# Patient Record
Sex: FEMALE | Race: WHITE | NOT HISPANIC OR LATINO | URBAN - NONMETROPOLITAN AREA
[De-identification: names, ages, dates, MRNs, and addresses within clinical notes are randomized per-mention and may not be internally consistent; named-entity substitution may affect disease eponyms.]

---

## 2023-03-24 DIAGNOSIS — E11.9 TYPE 2 DIABETES MELLITUS WITHOUT COMPLICATION, WITHOUT LONG-TERM CURRENT USE OF INSULIN (MULTI): Primary | ICD-10-CM

## 2023-03-24 RX ORDER — METFORMIN HYDROCHLORIDE 750 MG/1
1500 TABLET, EXTENDED RELEASE ORAL
COMMUNITY
End: 2023-03-24 | Stop reason: SDUPTHER

## 2023-04-04 RX ORDER — METFORMIN HYDROCHLORIDE 750 MG/1
1500 TABLET, EXTENDED RELEASE ORAL
Qty: 180 TABLET | Refills: 0 | Status: SHIPPED | OUTPATIENT
Start: 2023-04-04 | End: 2023-07-12 | Stop reason: WASHOUT

## 2023-06-28 DIAGNOSIS — E78.5 DYSLIPIDEMIA: ICD-10-CM

## 2023-06-28 RX ORDER — SIMVASTATIN 20 MG/1
20 TABLET, FILM COATED ORAL NIGHTLY
Qty: 90 TABLET | Refills: 0 | Status: SHIPPED | OUTPATIENT
Start: 2023-06-28 | End: 2023-07-17 | Stop reason: SDUPTHER

## 2023-06-28 RX ORDER — SIMVASTATIN 20 MG/1
20 TABLET, FILM COATED ORAL NIGHTLY
COMMUNITY
End: 2023-06-28 | Stop reason: SDUPTHER

## 2023-07-11 PROBLEM — E11.9 DM2 (DIABETES MELLITUS, TYPE 2) (MULTI): Status: ACTIVE | Noted: 2023-07-11

## 2023-07-11 PROBLEM — M75.101 NONTRAUMATIC TEAR OF RIGHT ROTATOR CUFF: Status: RESOLVED | Noted: 2023-07-11 | Resolved: 2023-07-11

## 2023-07-11 PROBLEM — E55.9 VITAMIN D DEFICIENCY: Status: ACTIVE | Noted: 2023-07-11

## 2023-07-11 PROBLEM — H81.10 BPPV (BENIGN PAROXYSMAL POSITIONAL VERTIGO): Status: ACTIVE | Noted: 2023-07-11

## 2023-07-11 PROBLEM — E78.5 DYSLIPIDEMIA: Status: ACTIVE | Noted: 2023-07-11

## 2023-07-11 PROBLEM — Z90.89 HISTORY OF TONSILLECTOMY: Status: RESOLVED | Noted: 2023-07-11 | Resolved: 2023-07-11

## 2023-07-11 PROBLEM — F41.9 ANXIETY: Status: ACTIVE | Noted: 2023-07-11

## 2023-07-11 PROBLEM — I10 HTN (HYPERTENSION): Status: ACTIVE | Noted: 2023-07-11

## 2023-07-11 PROBLEM — M54.12 CERVICAL RADICULOPATHY, ACUTE: Status: RESOLVED | Noted: 2023-07-11 | Resolved: 2023-07-11

## 2023-07-11 PROBLEM — K44.9 HIATAL HERNIA: Status: ACTIVE | Noted: 2023-07-11

## 2023-07-11 PROBLEM — F17.210 CIGARETTE NICOTINE DEPENDENCE WITHOUT COMPLICATION: Status: ACTIVE | Noted: 2023-07-11

## 2023-07-11 PROBLEM — M75.100 ROTATOR CUFF TEAR: Status: RESOLVED | Noted: 2023-07-11 | Resolved: 2023-07-11

## 2023-07-11 PROBLEM — J30.9 ALLERGIC RHINITIS: Status: ACTIVE | Noted: 2023-07-11

## 2023-07-11 PROBLEM — M50.20 HERNIATED CERVICAL DISC: Status: ACTIVE | Noted: 2023-07-11

## 2023-07-11 PROBLEM — N18.1 STAGE 1 CHRONIC KIDNEY DISEASE: Status: ACTIVE | Noted: 2023-07-11

## 2023-07-11 PROBLEM — R53.82 CHRONIC FATIGUE: Status: ACTIVE | Noted: 2023-07-11

## 2023-07-11 PROBLEM — K21.9 GERD (GASTROESOPHAGEAL REFLUX DISEASE): Status: ACTIVE | Noted: 2023-07-11

## 2023-07-11 RX ORDER — OMEPRAZOLE 40 MG/1
1 CAPSULE, DELAYED RELEASE ORAL
COMMUNITY
Start: 2023-02-07 | End: 2023-07-12 | Stop reason: WASHOUT

## 2023-07-11 RX ORDER — SUCRALFATE 1 G/1
1 TABLET ORAL
COMMUNITY
Start: 2023-02-07 | End: 2024-02-21 | Stop reason: WASHOUT

## 2023-07-11 RX ORDER — ESCITALOPRAM OXALATE 20 MG/1
20 TABLET ORAL DAILY
COMMUNITY
End: 2023-07-12 | Stop reason: WASHOUT

## 2023-07-11 RX ORDER — BACLOFEN 10 MG/1
10 TABLET ORAL 3 TIMES DAILY
COMMUNITY
Start: 2022-11-16 | End: 2023-07-12 | Stop reason: WASHOUT

## 2023-07-11 RX ORDER — BLOOD-GLUCOSE METER
EACH MISCELLANEOUS
COMMUNITY

## 2023-07-11 RX ORDER — VIT C/E/ZN/COPPR/LUTEIN/ZEAXAN 250MG-90MG
25 CAPSULE ORAL DAILY
COMMUNITY
Start: 2021-04-15

## 2023-07-11 RX ORDER — MELOXICAM 15 MG/1
1 TABLET ORAL DAILY PRN
COMMUNITY
Start: 2020-06-30 | End: 2024-05-24 | Stop reason: SDUPTHER

## 2023-07-11 RX ORDER — LISINOPRIL 5 MG/1
5 TABLET ORAL DAILY
COMMUNITY
End: 2023-07-17 | Stop reason: SDUPTHER

## 2023-07-11 RX ORDER — GLIMEPIRIDE 1 MG/1
1 TABLET ORAL
COMMUNITY
End: 2023-07-12 | Stop reason: WASHOUT

## 2023-07-11 RX ORDER — FAMOTIDINE 20 MG/1
1 TABLET, FILM COATED ORAL NIGHTLY PRN
COMMUNITY
Start: 2023-02-07 | End: 2024-02-21

## 2023-07-11 RX ORDER — ACETAMINOPHEN, DEXTROMETHORPHAN HBR, DOXYLAMINE SUCCINATE, PHENYLEPHRINE HCL 650; 20; 12.5; 1 MG/30ML; MG/30ML; MG/30ML; MG/30ML
1 SOLUTION ORAL DAILY
COMMUNITY
Start: 2021-04-15

## 2023-07-11 RX ORDER — LANCETS 30 GAUGE
EACH MISCELLANEOUS
COMMUNITY
Start: 2023-06-01

## 2023-07-11 RX ORDER — MECLIZINE HCL 12.5 MG 12.5 MG/1
1 TABLET ORAL 3 TIMES DAILY PRN
COMMUNITY
Start: 2023-02-07 | End: 2024-02-21 | Stop reason: WASHOUT

## 2023-07-12 ENCOUNTER — OFFICE VISIT (OUTPATIENT)
Dept: PRIMARY CARE | Facility: CLINIC | Age: 53
End: 2023-07-12
Payer: COMMERCIAL

## 2023-07-12 VITALS
TEMPERATURE: 98.6 F | SYSTOLIC BLOOD PRESSURE: 110 MMHG | DIASTOLIC BLOOD PRESSURE: 84 MMHG | WEIGHT: 161.6 LBS | HEIGHT: 63 IN | HEART RATE: 86 BPM | OXYGEN SATURATION: 96 % | BODY MASS INDEX: 28.63 KG/M2

## 2023-07-12 DIAGNOSIS — F34.1 PERSISTENT DEPRESSIVE DISORDER: Primary | ICD-10-CM

## 2023-07-12 DIAGNOSIS — E11.9 TYPE 2 DIABETES MELLITUS WITHOUT COMPLICATION, UNSPECIFIED WHETHER LONG TERM INSULIN USE (MULTI): ICD-10-CM

## 2023-07-12 DIAGNOSIS — Z12.31 ENCOUNTER FOR SCREENING MAMMOGRAM FOR BREAST CANCER: ICD-10-CM

## 2023-07-12 DIAGNOSIS — M79.10 MYALGIA: ICD-10-CM

## 2023-07-12 DIAGNOSIS — K21.9 GASTROESOPHAGEAL REFLUX DISEASE WITHOUT ESOPHAGITIS: ICD-10-CM

## 2023-07-12 PROBLEM — I10 HTN (HYPERTENSION): Status: RESOLVED | Noted: 2023-07-11 | Resolved: 2023-07-12

## 2023-07-12 PROCEDURE — 3066F NEPHROPATHY DOC TX: CPT | Performed by: PHYSICIAN ASSISTANT

## 2023-07-12 PROCEDURE — 3074F SYST BP LT 130 MM HG: CPT | Performed by: PHYSICIAN ASSISTANT

## 2023-07-12 PROCEDURE — 4010F ACE/ARB THERAPY RXD/TAKEN: CPT | Performed by: PHYSICIAN ASSISTANT

## 2023-07-12 PROCEDURE — 3079F DIAST BP 80-89 MM HG: CPT | Performed by: PHYSICIAN ASSISTANT

## 2023-07-12 PROCEDURE — 99214 OFFICE O/P EST MOD 30 MIN: CPT | Performed by: PHYSICIAN ASSISTANT

## 2023-07-12 RX ORDER — PANTOPRAZOLE SODIUM 20 MG/1
20 TABLET, DELAYED RELEASE ORAL
COMMUNITY
End: 2024-02-21 | Stop reason: WASHOUT

## 2023-07-12 RX ORDER — OMEPRAZOLE 20 MG/1
20 CAPSULE, DELAYED RELEASE ORAL
Qty: 90 CAPSULE | Refills: 1 | Status: SHIPPED | OUTPATIENT
Start: 2023-07-12 | End: 2024-05-24 | Stop reason: WASHOUT

## 2023-07-12 RX ORDER — DULOXETIN HYDROCHLORIDE 30 MG/1
30 CAPSULE, DELAYED RELEASE ORAL 2 TIMES DAILY
Qty: 60 CAPSULE | Refills: 5 | Status: SHIPPED | OUTPATIENT
Start: 2023-07-12 | End: 2023-07-13 | Stop reason: SDUPTHER

## 2023-07-12 ASSESSMENT — PATIENT HEALTH QUESTIONNAIRE - PHQ9
3. TROUBLE FALLING OR STAYING ASLEEP OR SLEEPING TOO MUCH: NEARLY EVERY DAY
10. IF YOU CHECKED OFF ANY PROBLEMS, HOW DIFFICULT HAVE THESE PROBLEMS MADE IT FOR YOU TO DO YOUR WORK, TAKE CARE OF THINGS AT HOME, OR GET ALONG WITH OTHER PEOPLE: VERY DIFFICULT
4. FEELING TIRED OR HAVING LITTLE ENERGY: NEARLY EVERY DAY
SUM OF ALL RESPONSES TO PHQ9 QUESTIONS 1 AND 2: 6
SUM OF ALL RESPONSES TO PHQ QUESTIONS 1-9: 20
6. FEELING BAD ABOUT YOURSELF - OR THAT YOU ARE A FAILURE OR HAVE LET YOURSELF OR YOUR FAMILY DOWN: SEVERAL DAYS
1. LITTLE INTEREST OR PLEASURE IN DOING THINGS: NEARLY EVERY DAY
2. FEELING DOWN, DEPRESSED OR HOPELESS: NEARLY EVERY DAY
7. TROUBLE CONCENTRATING ON THINGS, SUCH AS READING THE NEWSPAPER OR WATCHING TELEVISION: MORE THAN HALF THE DAYS
9. THOUGHTS THAT YOU WOULD BE BETTER OFF DEAD, OR OF HURTING YOURSELF: NOT AT ALL
5. POOR APPETITE OR OVEREATING: NEARLY EVERY DAY
8. MOVING OR SPEAKING SO SLOWLY THAT OTHER PEOPLE COULD HAVE NOTICED. OR THE OPPOSITE, BEING SO FIGETY OR RESTLESS THAT YOU HAVE BEEN MOVING AROUND A LOT MORE THAN USUAL: MORE THAN HALF THE DAYS

## 2023-07-12 ASSESSMENT — ANXIETY QUESTIONNAIRES
2. NOT BEING ABLE TO STOP OR CONTROL WORRYING: NEARLY EVERY DAY
5. BEING SO RESTLESS THAT IT IS HARD TO SIT STILL: NOT AT ALL
6. BECOMING EASILY ANNOYED OR IRRITABLE: MORE THAN HALF THE DAYS
3. WORRYING TOO MUCH ABOUT DIFFERENT THINGS: NEARLY EVERY DAY
IF YOU CHECKED OFF ANY PROBLEMS ON THIS QUESTIONNAIRE, HOW DIFFICULT HAVE THESE PROBLEMS MADE IT FOR YOU TO DO YOUR WORK, TAKE CARE OF THINGS AT HOME, OR GET ALONG WITH OTHER PEOPLE: VERY DIFFICULT
GAD7 TOTAL SCORE: 11
7. FEELING AFRAID AS IF SOMETHING AWFUL MIGHT HAPPEN: NOT AT ALL
1. FEELING NERVOUS, ANXIOUS, OR ON EDGE: MORE THAN HALF THE DAYS
4. TROUBLE RELAXING: SEVERAL DAYS

## 2023-07-12 NOTE — PROGRESS NOTES
Subjective     HPI   Carmelita Mcdonald is a 52 y.o. year old female patient with presenting to clinic with concern for   Chief Complaint   Patient presents with   • New Patient Visit     Establish care    • Med Refill   • Fatigue       Patient presents to clinic to establish care. Carmelita is a type 2 diabetic with GERD    A1c 7.3% [facility] endocrinologist. 4/20/2023    Mamm due since march  Colonoscpy 8 years ago.    Patient Active Problem List   Diagnosis   • BPPV (benign paroxysmal positional vertigo)   • Anxiety   • Allergic rhinitis   • Chronic fatigue   • DM2 (diabetes mellitus, type 2) (CMS/[facility])   • Dyslipidemia   • Herniated cervical disc   • Hiatal hernia   • GERD (gastroesophageal reflux disease)   • Cigarette nicotine dependence without complication   • Stage 1 chronic kidney disease   • Vitamin D deficiency       Past Medical History:   Diagnosis Date   • Cervical radiculopathy, acute 07/11/2023   • History of tonsillectomy 07/11/2023   • Nontraumatic tear of right rotator cuff 07/11/2023   • Rotator cuff tear 07/11/2023      Past Surgical History:   Procedure Laterality Date   • OTHER SURGICAL HISTORY  06/30/2020    Hysterectomy      Family History   Problem Relation Name Age of Onset   • Diabetes Mother     • Hypertension Mother     • Hyperlipidemia Mother     • Breast cancer Mother     • Diabetes Father     • Hypertension Father        Social History     Tobacco Use   • Smoking status: Every Day     Types: Cigarettes   • Smokeless tobacco: Never   Substance Use Topics   • Alcohol use: Yes     Comment: social        Current Outpatient Medications:   •  cyanocobalamin, vitamin B-12, (Vitamin B-12) 1,000 mcg tablet extended release, Take 1,000 mcg by mouth once daily., Disp: , Rfl:   •  lisinopril 5 mg tablet, Take 1 tablet (5 mg) by mouth once daily., Disp: , Rfl:   •  semaglutide (OZEMPIC) 1 mg/dose (4 mg/3 mL) pen injector, Inject 1 mg under the skin once a week., Disp: , Rfl:   •  simvastatin (Zocor) 20 mg  "tablet, Take 1 tablet (20 mg) by mouth once daily at bedtime., Disp: 90 tablet, Rfl: 0  •  cholecalciferol (Vitamin D-3) 25 MCG (1000 UT) capsule, Take 1 capsule (25 mcg) by mouth once daily., Disp: , Rfl:   •  DULoxetine (Cymbalta) 30 mg DR capsule, Take 1 capsule (30 mg) by mouth 2 times a day. Do not crush or chew., Disp: 60 capsule, Rfl: 5  •  famotidine (Pepcid) 20 mg tablet, Take 1 tablet (20 mg) by mouth as needed at bedtime for heartburn., Disp: , Rfl:   •  meclizine (Antivert) 12.5 mg tablet, Take 1 tablet (12.5 mg) by mouth 3 times a day as needed for dizziness., Disp: , Rfl:   •  meloxicam (Mobic) 15 mg tablet, Take 1 tablet (15 mg) by mouth once daily as needed for moderate pain (4 - 6)., Disp: , Rfl:   •  omeprazole (PriLOSEC) 20 mg DR capsule, Take 1 capsule (20 mg) by mouth once daily in the morning. Take before meals. Do not crush or chew., Disp: 90 capsule, Rfl: 1  •  OneTouch Verio Flex meter misc, Use as directed, Disp: , Rfl:   •  OneTouch Verio test strips strip, Use as instructed to check blood sugar 3 times daily. E11.65, Disp: , Rfl:   •  pantoprazole (ProtoNix) 20 mg EC tablet, Take 1 tablet (20 mg) by mouth once daily in the morning. Take before meals. Do not crush, chew, or split., Disp: , Rfl:   •  sucralfate (Carafate) 1 gram tablet, Take 1 tablet (1 g) by mouth 4 times a day before meals. Do not take within 1 hour of other medications, Disp: , Rfl:      Review of Systems  Review of Systems:   Constitutional: Denies fever  HEENT: Denies ST, earache  CVS: Denies Chest pain  Pulmonary: Denies wheezing, SOB  GI: Denies N/V  : Denies dysuria  Musculoskeletal:  Denies myalgia  Neuro: Denies focal weakness or numbness.  Skin: Denies Rashes.  *Review of Systems is negative unless otherwise mentioned in HPI or ROS above.    Objective   /84   Pulse 86   Temp 37 °C (98.6 °F)   Ht 1.6 m (5' 3\")   Wt 73.3 kg (161 lb 9.6 oz)   SpO2 96%   BMI 28.63 kg/m²     Physical Exam  Physical " "exam:  /84   Pulse 86   Temp 37 °C (98.6 °F)   Ht 1.6 m (5' 3\")   Wt 73.3 kg (161 lb 9.6 oz)   SpO2 96%   BMI 28.63 kg/m²  reviewed   Body mass index is 28.63 kg/m².   Constitutional: NAD.  Resting comfortably.  Head: Atraumatic, normocephalic.  EENT: deferred d/t masking  Cardiac: Regular rate & rhythm.   Pulmonary: Lungs clear bilat  GI: Soft, Nontender, nondistended.   Musculoskeletal: No peripheral edema.   Skin: No evidence of trauma. No rashes  Psych: Intact judgement and insight.    .Assessment/Plan   Problem List Items Addressed This Visit       DM2 (diabetes mellitus, type 2) (CMS/HCC)    GERD (gastroesophageal reflux disease)    Relevant Medications    omeprazole (PriLOSEC) 20 mg DR capsule     Other Visit Diagnoses       Persistent depressive disorder    -  Primary    Relevant Medications    DULoxetine (Cymbalta) 30 mg DR capsule    Encounter for screening mammogram for breast cancer        Relevant Orders    BI mammo bilateral screening tomosynthesis    Myalgia        Relevant Medications    DULoxetine (Cymbalta) 30 mg DR capsule            "

## 2023-07-13 DIAGNOSIS — F34.1 PERSISTENT DEPRESSIVE DISORDER: ICD-10-CM

## 2023-07-13 DIAGNOSIS — M79.10 MYALGIA: ICD-10-CM

## 2023-07-13 RX ORDER — DULOXETIN HYDROCHLORIDE 30 MG/1
30 CAPSULE, DELAYED RELEASE ORAL DAILY
Qty: 90 CAPSULE | Refills: 0 | Status: SHIPPED | OUTPATIENT
Start: 2023-07-13 | End: 2023-08-24 | Stop reason: SDUPTHER

## 2023-07-17 DIAGNOSIS — E78.5 DYSLIPIDEMIA: ICD-10-CM

## 2023-07-17 DIAGNOSIS — E11.69 TYPE 2 DIABETES MELLITUS WITH OTHER SPECIFIED COMPLICATION, WITHOUT LONG-TERM CURRENT USE OF INSULIN (MULTI): Primary | ICD-10-CM

## 2023-07-17 RX ORDER — LISINOPRIL 5 MG/1
5 TABLET ORAL DAILY
Qty: 90 TABLET | Refills: 0 | Status: SHIPPED | OUTPATIENT
Start: 2023-07-17 | End: 2023-08-24 | Stop reason: SDUPTHER

## 2023-07-17 RX ORDER — SIMVASTATIN 20 MG/1
20 TABLET, FILM COATED ORAL NIGHTLY
Qty: 90 TABLET | Refills: 0 | Status: SHIPPED | OUTPATIENT
Start: 2023-07-17 | End: 2023-08-24 | Stop reason: SDUPTHER

## 2023-08-14 NOTE — PROGRESS NOTES
Subjective     HPI   Carmelita Mcdonald is a 52 y.o. year old female patient with presenting to clinic with concern for No chief complaint on file.      Carmelita presents to clinic for follow up.  Hx DM and GERD.  Mammogram overdue    A1c 7.3% [facility] endocrinologist. 4/20/2023-- last A1c??  Endocrinology has submitted prior auth for ozempic.      Colonoscpy 8 years ago.    Patient Active Problem List   Diagnosis    BPPV (benign paroxysmal positional vertigo)    Anxiety    Allergic rhinitis    Chronic fatigue    DM2 (diabetes mellitus, type 2) (CMS/[facility])    Dyslipidemia    Herniated cervical disc    Hiatal hernia    GERD (gastroesophageal reflux disease)    Cigarette nicotine dependence without complication    Stage 1 chronic kidney disease    Vitamin D deficiency       Past Medical History:   Diagnosis Date    Cervical radiculopathy, acute 07/11/2023    History of tonsillectomy 07/11/2023    Nontraumatic tear of right rotator cuff 07/11/2023    Rotator cuff tear 07/11/2023      Past Surgical History:   Procedure Laterality Date    OTHER SURGICAL HISTORY  06/30/2020    Hysterectomy      Family History   Problem Relation Name Age of Onset    Diabetes Mother      Hypertension Mother      Hyperlipidemia Mother      Breast cancer Mother      Diabetes Father      Hypertension Father        Social History     Tobacco Use    Smoking status: Every Day     Types: Cigarettes    Smokeless tobacco: Never   Substance Use Topics    Alcohol use: Yes     Comment: social        Current Outpatient Medications:     cholecalciferol (Vitamin D-3) 25 MCG (1000 UT) capsule, Take 1 capsule (25 mcg) by mouth once daily., Disp: , Rfl:     cyanocobalamin, vitamin B-12, (Vitamin B-12) 1,000 mcg tablet extended release, Take 1,000 mcg by mouth once daily., Disp: , Rfl:     DULoxetine (Cymbalta) 30 mg DR capsule, Take 1 capsule (30 mg) by mouth once daily. Do not crush or chew., Disp: 90 capsule, Rfl: 0    famotidine (Pepcid) 20 mg tablet, Take 1 tablet (20  mg) by mouth as needed at bedtime for heartburn., Disp: , Rfl:     lisinopril 5 mg tablet, Take 1 tablet (5 mg) by mouth once daily., Disp: 90 tablet, Rfl: 0    meclizine (Antivert) 12.5 mg tablet, Take 1 tablet (12.5 mg) by mouth 3 times a day as needed for dizziness., Disp: , Rfl:     meloxicam (Mobic) 15 mg tablet, Take 1 tablet (15 mg) by mouth once daily as needed for moderate pain (4 - 6)., Disp: , Rfl:     omeprazole (PriLOSEC) 20 mg DR capsule, Take 1 capsule (20 mg) by mouth once daily in the morning. Take before meals. Do not crush or chew., Disp: 90 capsule, Rfl: 1    OneTouch Verio Flex meter misc, Use as directed, Disp: , Rfl:     OneTouch Verio test strips strip, Use as instructed to check blood sugar 3 times daily. E11.65, Disp: , Rfl:     pantoprazole (ProtoNix) 20 mg EC tablet, Take 1 tablet (20 mg) by mouth once daily in the morning. Take before meals. Do not crush, chew, or split., Disp: , Rfl:     semaglutide (OZEMPIC) 1 mg/dose (4 mg/3 mL) pen injector, Inject 1 mg under the skin once a week., Disp: , Rfl:     simvastatin (Zocor) 20 mg tablet, Take 1 tablet (20 mg) by mouth once daily at bedtime., Disp: 90 tablet, Rfl: 0    sucralfate (Carafate) 1 gram tablet, Take 1 tablet (1 g) by mouth 4 times a day before meals. Do not take within 1 hour of other medications, Disp: , Rfl:      Review of Systems  Review of Systems:   Constitutional: Denies fever  HEENT: Denies ST, earache  CVS: Denies Chest pain  Pulmonary: Denies wheezing, SOB  GI: Denies N/V  : Denies dysuria  Musculoskeletal:  Denies myalgia  Neuro: Denies focal weakness or numbness.  Skin: Denies Rashes.  *Review of Systems is negative unless otherwise mentioned in HPI or ROS above.    Objective   There were no vitals taken for this visit.    Physical Exam  Physical exam:  There were no vitals taken for this visit. reviewed   There is no height or weight on file to calculate BMI.   Constitutional: NAD.  Resting comfortably.  Head:  Atraumatic, normocephalic.  EENT: deferred d/t masking  Cardiac: Regular rate & rhythm.   Pulmonary: Lungs clear bilat  GI: Soft, Nontender, nondistended.   Musculoskeletal: No peripheral edema.   Skin: No evidence of trauma. No rashes  Psych: Intact judgement and insight.    .Assessment/Plan   {Assess/PlanSmartLinks:[email or web address]}

## 2023-08-16 ENCOUNTER — APPOINTMENT (OUTPATIENT)
Dept: PRIMARY CARE | Facility: CLINIC | Age: 53
End: 2023-08-16
Payer: COMMERCIAL

## 2023-08-24 ENCOUNTER — OFFICE VISIT (OUTPATIENT)
Dept: PRIMARY CARE | Facility: CLINIC | Age: 53
End: 2023-08-24
Payer: COMMERCIAL

## 2023-08-24 VITALS
SYSTOLIC BLOOD PRESSURE: 100 MMHG | WEIGHT: 157 LBS | HEART RATE: 90 BPM | HEIGHT: 63 IN | OXYGEN SATURATION: 97 % | DIASTOLIC BLOOD PRESSURE: 70 MMHG | TEMPERATURE: 97.6 F | BODY MASS INDEX: 27.82 KG/M2

## 2023-08-24 DIAGNOSIS — E11.69 TYPE 2 DIABETES MELLITUS WITH OTHER SPECIFIED COMPLICATION, WITHOUT LONG-TERM CURRENT USE OF INSULIN (MULTI): ICD-10-CM

## 2023-08-24 DIAGNOSIS — E78.5 DYSLIPIDEMIA: ICD-10-CM

## 2023-08-24 DIAGNOSIS — M79.10 MYALGIA: ICD-10-CM

## 2023-08-24 DIAGNOSIS — Z12.2 SCREENING FOR LUNG CANCER: Primary | ICD-10-CM

## 2023-08-24 DIAGNOSIS — F34.1 PERSISTENT DEPRESSIVE DISORDER: ICD-10-CM

## 2023-08-24 PROCEDURE — 4010F ACE/ARB THERAPY RXD/TAKEN: CPT | Performed by: PHYSICIAN ASSISTANT

## 2023-08-24 PROCEDURE — 3074F SYST BP LT 130 MM HG: CPT | Performed by: PHYSICIAN ASSISTANT

## 2023-08-24 PROCEDURE — 3078F DIAST BP <80 MM HG: CPT | Performed by: PHYSICIAN ASSISTANT

## 2023-08-24 PROCEDURE — 99213 OFFICE O/P EST LOW 20 MIN: CPT | Performed by: PHYSICIAN ASSISTANT

## 2023-08-24 PROCEDURE — 3066F NEPHROPATHY DOC TX: CPT | Performed by: PHYSICIAN ASSISTANT

## 2023-08-24 RX ORDER — DULOXETIN HYDROCHLORIDE 30 MG/1
30 CAPSULE, DELAYED RELEASE ORAL DAILY
Qty: 90 CAPSULE | Refills: 3 | Status: SHIPPED | OUTPATIENT
Start: 2023-08-24 | End: 2024-08-23

## 2023-08-24 RX ORDER — METFORMIN HYDROCHLORIDE 750 MG/1
750 TABLET, EXTENDED RELEASE ORAL 2 TIMES DAILY
COMMUNITY
Start: 2020-05-05 | End: 2024-05-31 | Stop reason: WASHOUT

## 2023-08-24 RX ORDER — SIMVASTATIN 20 MG/1
20 TABLET, FILM COATED ORAL NIGHTLY
Qty: 90 TABLET | Refills: 3 | Status: SHIPPED | OUTPATIENT
Start: 2023-08-24 | End: 2024-06-03 | Stop reason: ALTCHOICE

## 2023-08-24 RX ORDER — LISINOPRIL 5 MG/1
5 TABLET ORAL DAILY
Qty: 90 TABLET | Refills: 3 | Status: SHIPPED | OUTPATIENT
Start: 2023-08-24 | End: 2024-08-23

## 2023-08-24 ASSESSMENT — PATIENT HEALTH QUESTIONNAIRE - PHQ9
SUM OF ALL RESPONSES TO PHQ9 QUESTIONS 1 AND 2: 0
1. LITTLE INTEREST OR PLEASURE IN DOING THINGS: NOT AT ALL
2. FEELING DOWN, DEPRESSED OR HOPELESS: NOT AT ALL
SUM OF ALL RESPONSES TO PHQ9 QUESTIONS 1 AND 2: 0
1. LITTLE INTEREST OR PLEASURE IN DOING THINGS: NOT AT ALL
2. FEELING DOWN, DEPRESSED OR HOPELESS: NOT AT ALL

## 2023-08-24 NOTE — PROGRESS NOTES
Subjective     HPI   Carmelita Mcdonald is a 52 y.o. year old female patient with presenting to clinic with concern for   Chief Complaint   Patient presents with    Follow-up     1 mth. Doing well since last visit.        Carmelita presents to clinic for follow up.  Hx DM and GERD.    Mammogram overdue- ordered in July- needs to schedule please.   No pap- Hx hysterectomy.  A1c 7.3% [facility] endocrinologist. 4/20/2023 7.1%, started ozempic recently by endocrinology but now insurance is not covering this dose- has been off for 2 weeks.     Patient Active Problem List   Diagnosis    BPPV (benign paroxysmal positional vertigo)    Anxiety    Allergic rhinitis    Chronic fatigue    DM2 (diabetes mellitus, type 2) (CMS/[facility])    Dyslipidemia    Herniated cervical disc    Hiatal hernia    GERD (gastroesophageal reflux disease)    Cigarette nicotine dependence without complication    Stage 1 chronic kidney disease    Vitamin D deficiency       Past Medical History:   Diagnosis Date    Cervical radiculopathy, acute 07/11/2023    History of tonsillectomy 07/11/2023    Nontraumatic tear of right rotator cuff 07/11/2023    Rotator cuff tear 07/11/2023      Past Surgical History:   Procedure Laterality Date    HYSTERECTOMY  06/30/2020    Hysterectomy      Family History   Problem Relation Name Age of Onset    Diabetes Mother      Hypertension Mother      Hyperlipidemia Mother      Breast cancer Mother      Diabetes Father      Hypertension Father        Social History     Tobacco Use    Smoking status: Every Day     Packs/day: 1.00     Years: 35.00     Additional pack years: 0.00     Total pack years: 35.00     Types: Cigarettes    Smokeless tobacco: Never   Substance Use Topics    Alcohol use: Yes     Comment: social        Current Outpatient Medications:     cholecalciferol (Vitamin D-3) 25 MCG (1000 UT) capsule, Take 1 capsule (25 mcg) by mouth once daily., Disp: , Rfl:     cyanocobalamin, vitamin B-12, (Vitamin B-12) 1,000 mcg tablet  extended release, Take 1,000 mcg by mouth once daily., Disp: , Rfl:     famotidine (Pepcid) 20 mg tablet, Take 1 tablet (20 mg) by mouth as needed at bedtime for heartburn., Disp: , Rfl:     meclizine (Antivert) 12.5 mg tablet, Take 1 tablet (12.5 mg) by mouth 3 times a day as needed for dizziness., Disp: , Rfl:     meloxicam (Mobic) 15 mg tablet, Take 1 tablet (15 mg) by mouth once daily as needed for moderate pain (4 - 6)., Disp: , Rfl:     metFORMIN XR (Glucophage-XR) 750 mg 24 hr tablet, 1 tablet (750 mg) 2 times a day., Disp: , Rfl:     omeprazole (PriLOSEC) 20 mg DR capsule, Take 1 capsule (20 mg) by mouth once daily in the morning. Take before meals. Do not crush or chew., Disp: 90 capsule, Rfl: 1    OneTouch Verio Flex meter misc, Use as directed, Disp: , Rfl:     OneTouch Verio test strips strip, Use as instructed to check blood sugar 3 times daily. E11.65, Disp: , Rfl:     pantoprazole (ProtoNix) 20 mg EC tablet, Take 1 tablet (20 mg) by mouth once daily in the morning. Take before meals. Do not crush, chew, or split., Disp: , Rfl:     sucralfate (Carafate) 1 gram tablet, Take 1 tablet (1 g) by mouth 4 times a day before meals. Do not take within 1 hour of other medications, Disp: , Rfl:     DULoxetine (Cymbalta) 30 mg DR capsule, Take 1 capsule (30 mg) by mouth once daily. Do not crush or chew., Disp: 90 capsule, Rfl: 3    lisinopril 5 mg tablet, Take 1 tablet (5 mg) by mouth once daily., Disp: 90 tablet, Rfl: 3    semaglutide (OZEMPIC) 1 mg/dose (4 mg/3 mL) pen injector, Inject 1 mg under the skin once a week., Disp: , Rfl:     simvastatin (Zocor) 20 mg tablet, Take 1 tablet (20 mg) by mouth once daily at bedtime., Disp: 90 tablet, Rfl: 3     Review of Systems  Constitutional: Denies fever  HEENT: Denies ST, earache  CVS: Denies Chest pain  Pulmonary: Denies wheezing, SOB  GI: Denies N/V  : Denies dysuria  Musculoskeletal:  Denies myalgia  Neuro: Denies focal weakness or numbness.  Skin: Denies  "Rashes.  *Review of Systems is negative unless otherwise mentioned in HPI or ROS above.    Objective   /70   Pulse 90   Temp 36.4 °C (97.6 °F)   Ht 1.6 m (5' 3\")   Wt 71.2 kg (157 lb)   SpO2 97%   BMI 27.81 kg/m²  reviewed Body mass index is 27.81 kg/m².     Physical Exam  Constitutional: NAD.  Resting comfortably.  Head: Atraumatic, normocephalic.  Eyes: PERRL. EOM intact.   ENT: Moist oral mucosa. Nasal mucosa mildly edematous and nonerythematous.   Cardiac: Regular rate & rhythm.   Pulmonary: Lungs clear bilat  GI: Soft, Nontender, nondistended.   Musculoskeletal: No peripheral edema.   Skin: No evidence of trauma. No rashes  Psych: Intact judgement and insight.    .Assessment/Plan   Problem List Items Addressed This Visit       DM2 (diabetes mellitus, type 2) (CMS/HCC)    Relevant Medications    lisinopril 5 mg tablet    Dyslipidemia    Relevant Medications    simvastatin (Zocor) 20 mg tablet     Other Visit Diagnoses       Screening for lung cancer    -  Primary    Relevant Orders    CT lung screening low dose    Myalgia        Relevant Medications    DULoxetine (Cymbalta) 30 mg DR capsule    Persistent depressive disorder        Relevant Medications    DULoxetine (Cymbalta) 30 mg DR capsule            "

## 2023-11-28 ENCOUNTER — APPOINTMENT (OUTPATIENT)
Dept: RADIOLOGY | Facility: HOSPITAL | Age: 53
End: 2023-11-28
Payer: COMMERCIAL

## 2023-12-08 ENCOUNTER — HOSPITAL ENCOUNTER (OUTPATIENT)
Dept: RADIOLOGY | Facility: HOSPITAL | Age: 53
Discharge: HOME | End: 2023-12-08
Payer: COMMERCIAL

## 2023-12-08 DIAGNOSIS — Z12.31 ENCOUNTER FOR SCREENING MAMMOGRAM FOR BREAST CANCER: ICD-10-CM

## 2023-12-08 PROCEDURE — 77063 BREAST TOMOSYNTHESIS BI: CPT | Mod: BILATERAL PROCEDURE | Performed by: RADIOLOGY

## 2023-12-08 PROCEDURE — 77063 BREAST TOMOSYNTHESIS BI: CPT

## 2023-12-08 PROCEDURE — 77067 SCR MAMMO BI INCL CAD: CPT | Mod: BILATERAL PROCEDURE | Performed by: RADIOLOGY

## 2024-02-21 ENCOUNTER — OFFICE VISIT (OUTPATIENT)
Dept: PRIMARY CARE | Facility: CLINIC | Age: 54
End: 2024-02-21
Payer: COMMERCIAL

## 2024-02-21 VITALS
BODY MASS INDEX: 27.57 KG/M2 | HEART RATE: 90 BPM | OXYGEN SATURATION: 98 % | HEIGHT: 63 IN | WEIGHT: 155.6 LBS | DIASTOLIC BLOOD PRESSURE: 68 MMHG | SYSTOLIC BLOOD PRESSURE: 120 MMHG | TEMPERATURE: 97.6 F

## 2024-02-21 DIAGNOSIS — E11.9 TYPE 2 DIABETES MELLITUS WITHOUT COMPLICATION, UNSPECIFIED WHETHER LONG TERM INSULIN USE (MULTI): ICD-10-CM

## 2024-02-21 DIAGNOSIS — F17.210 CIGARETTE NICOTINE DEPENDENCE, UNCOMPLICATED: ICD-10-CM

## 2024-02-21 DIAGNOSIS — E78.5 DYSLIPIDEMIA: ICD-10-CM

## 2024-02-21 DIAGNOSIS — H65.03 NON-RECURRENT ACUTE SEROUS OTITIS MEDIA OF BOTH EARS: ICD-10-CM

## 2024-02-21 DIAGNOSIS — E78.5 BORDERLINE HYPERLIPIDEMIA: ICD-10-CM

## 2024-02-21 DIAGNOSIS — Z12.11 SCREENING FOR COLON CANCER: ICD-10-CM

## 2024-02-21 DIAGNOSIS — K21.9 GASTROESOPHAGEAL REFLUX DISEASE WITHOUT ESOPHAGITIS: Primary | ICD-10-CM

## 2024-02-21 DIAGNOSIS — E55.9 VITAMIN D INSUFFICIENCY: ICD-10-CM

## 2024-02-21 LAB — POC HEMOGLOBIN A1C: 7.7 % (ref 4.2–6.5)

## 2024-02-21 PROCEDURE — 83036 HEMOGLOBIN GLYCOSYLATED A1C: CPT | Performed by: PHYSICIAN ASSISTANT

## 2024-02-21 PROCEDURE — 99214 OFFICE O/P EST MOD 30 MIN: CPT | Performed by: PHYSICIAN ASSISTANT

## 2024-02-21 PROCEDURE — 4010F ACE/ARB THERAPY RXD/TAKEN: CPT | Performed by: PHYSICIAN ASSISTANT

## 2024-02-21 PROCEDURE — 3078F DIAST BP <80 MM HG: CPT | Performed by: PHYSICIAN ASSISTANT

## 2024-02-21 PROCEDURE — 3074F SYST BP LT 130 MM HG: CPT | Performed by: PHYSICIAN ASSISTANT

## 2024-02-21 RX ORDER — OMEPRAZOLE 20 MG/1
20 CAPSULE, DELAYED RELEASE ORAL
Qty: 90 CAPSULE | Refills: 1 | Status: CANCELLED | OUTPATIENT
Start: 2024-02-21 | End: 2024-08-19

## 2024-02-21 RX ORDER — FAMOTIDINE 40 MG/1
40 TABLET, FILM COATED ORAL NIGHTLY
Qty: 90 TABLET | Refills: 1 | Status: SHIPPED | OUTPATIENT
Start: 2024-02-21 | End: 2024-08-19

## 2024-02-21 ASSESSMENT — PATIENT HEALTH QUESTIONNAIRE - PHQ9
1. LITTLE INTEREST OR PLEASURE IN DOING THINGS: NOT AT ALL
SUM OF ALL RESPONSES TO PHQ9 QUESTIONS 1 AND 2: 0
2. FEELING DOWN, DEPRESSED OR HOPELESS: NOT AT ALL

## 2024-02-21 NOTE — PATIENT INSTRUCTIONS
Patient has been unable to achieve medication compliance. We discussed functional issues affecting medication compliance and planned strategies to help integrate medication administration into their regular daily routine including association with established daily routine behaviors such as brushing teeth, picking up car keys, packing lunch, making coffee etc. Visual and audio reminders in addition to behavioral such as post it notes along the daily path during the intended time to take medication- on fridge, coffee pot, near sink or mirror and setting alarm on phone daily to remind automatically.

## 2024-02-21 NOTE — PROGRESS NOTES
Subjective     HPI   Carmelita Mcdonald is a 53 y.o. year old female patient with presenting to clinic with concern for   Chief Complaint   Patient presents with    Follow-up     6 mth.    Ear Fullness     Possible blockage- bilateral       Carmelita presents to clinic for follow up.  Hx DM and GERD.  GERD is improved off of ozempic. Poor evening dietary habits are contributing to this.     Forgets to take evening dose of metformin often. Discussed ways to improve compliance.    Had gastroenteritis and then COVID.   Stopped ozempic Jan 31st d/t nauseous, GERD, fatigue.   Has follow up with Endocrinology     No pap- Hx hysterectomy.  A1c 7.3% [facility] endocrinologist. 4/20/2023 7.1%, started ozempic recently by endocrinology but now insurance is not covering this dose- has been off for 2 weeks.     Patient Active Problem List   Diagnosis    BPPV (benign paroxysmal positional vertigo)    Anxiety    Allergic rhinitis    Chronic fatigue    DM2 (diabetes mellitus, type 2) (CMS/[facility])    Dyslipidemia    Herniated cervical disc    Hiatal hernia    GERD (gastroesophageal reflux disease)    Cigarette nicotine dependence without complication    Stage 1 chronic kidney disease    Vitamin D deficiency       Past Medical History:   Diagnosis Date    Cervical radiculopathy, acute 07/11/2023    History of tonsillectomy 07/11/2023    Nontraumatic tear of right rotator cuff 07/11/2023    Rotator cuff tear 07/11/2023      Past Surgical History:   Procedure Laterality Date    HYSTERECTOMY  06/30/2020    Hysterectomy      Family History   Problem Relation Name Age of Onset    Diabetes Mother      Hypertension Mother      Hyperlipidemia Mother      Breast cancer Mother  54    Diabetes Father      Hypertension Father        Social History     Tobacco Use    Smoking status: Every Day     Packs/day: 1.00     Years: 35.00     Additional pack years: 0.00     Total pack years: 35.00     Types: Cigarettes     Start date: 1986    Smokeless tobacco: Never     Tobacco comments:     Shared decision making: Discussed LDCT. Interested in lung cancer screening and pursuing treatment options if abnormality found. Patient is in agreement with this   Substance Use Topics    Alcohol use: Yes     Comment: social        Current Outpatient Medications:     cholecalciferol (Vitamin D-3) 25 MCG (1000 UT) capsule, Take 1 capsule (25 mcg) by mouth once daily., Disp: , Rfl:     cyanocobalamin, vitamin B-12, (Vitamin B-12) 1,000 mcg tablet extended release, Take 1 tablet (1,000 mcg) by mouth once daily., Disp: , Rfl:     DULoxetine (Cymbalta) 30 mg DR capsule, Take 1 capsule (30 mg) by mouth once daily. Do not crush or chew., Disp: 90 capsule, Rfl: 3    lisinopril 5 mg tablet, Take 1 tablet (5 mg) by mouth once daily., Disp: 90 tablet, Rfl: 3    meloxicam (Mobic) 15 mg tablet, Take 1 tablet (15 mg) by mouth once daily as needed for moderate pain (4 - 6)., Disp: , Rfl:     metFORMIN XR (Glucophage-XR) 750 mg 24 hr tablet, 1 tablet (750 mg) 2 times a day., Disp: , Rfl:     omeprazole (PriLOSEC) 20 mg DR capsule, Take 1 capsule (20 mg) by mouth once daily in the morning. Take before meals. Do not crush or chew., Disp: 90 capsule, Rfl: 1    OneTouch Verio Flex meter misc, Use as directed, Disp: , Rfl:     OneTouch Verio test strips strip, Use as instructed to check blood sugar 3 times daily. E11.65, Disp: , Rfl:     simvastatin (Zocor) 20 mg tablet, Take 1 tablet (20 mg) by mouth once daily at bedtime., Disp: 90 tablet, Rfl: 3    famotidine (Pepcid) 40 mg tablet, Take 1 tablet (40 mg) by mouth once daily at bedtime., Disp: 90 tablet, Rfl: 1    semaglutide (OZEMPIC) 1 mg/dose (4 mg/3 mL) pen injector, Inject 1 mg under the skin once a week., Disp: , Rfl:      Review of Systems  Constitutional: Denies fever  HEENT: Denies ST, earache  CVS: Denies Chest pain  Pulmonary: Denies wheezing, SOB  GI: Denies N/V  : Denies dysuria  Musculoskeletal:  Denies myalgia  Neuro: Denies focal weakness or  "numbness.  Skin: Denies Rashes.  *Review of Systems is negative unless otherwise mentioned in HPI or ROS above.    Objective   /68   Pulse 90   Temp 36.4 °C (97.6 °F)   Ht 1.6 m (5' 3\")   Wt 70.6 kg (155 lb 9.6 oz)   SpO2 98%   BMI 27.56 kg/m²  reviewed Body mass index is 27.56 kg/m².     Physical Exam  Constitutional: NAD.  Resting comfortably.  Head: Atraumatic, normocephalic.  ENT: Moist oral mucosa. Nasal mucosa wnl. Bilat TM buldging, nonerythematous  Cardiac: Regular rate & rhythm.   Pulmonary: Lungs clear bilat  GI: Soft, Nontender, nondistended.   Musculoskeletal: No peripheral edema.   Skin: No evidence of trauma. No rashes  Psych: Intact judgement and insight.    MDM  Encouraged smoking cessation.  Sees [facility] endocrinology. Last visit was virtual d/t covid. Next visit is also virtual and last A1c was 4 months ago 8.0% and she has been poorly compliant with metformin and has stopped taking ozempic.  Pt declined nasal spray for TM serous effusion    .Assessment/Plan   Problem List Items Addressed This Visit             ICD-10-CM    DM2 (diabetes mellitus, type 2) (CMS/[facility]) E11.9    Relevant Orders    POCT glycosylated hemoglobin (Hb A1C) manually resulted    Dyslipidemia E78.5    GERD (gastroesophageal reflux disease) - Primary K21.9    Relevant Medications    famotidine (Pepcid) 40 mg tablet     Other Visit Diagnoses         Codes    Cigarette nicotine dependence, uncomplicated     F17.210    Relevant Orders    CT lung screening low dose    Non-recurrent acute serous otitis media of both ears     H65.03    Borderline hyperlipidemia     E78.5    Relevant Orders    CBC    Comprehensive Metabolic Panel    TSH with reflex to Free T4 if abnormal    Lipid Panel    Vitamin D insufficiency     E55.9    Relevant Orders    Vitamin D 25-Hydroxy,Total (for eval of Vitamin D levels)            "

## 2024-03-06 ENCOUNTER — LAB (OUTPATIENT)
Dept: LAB | Facility: LAB | Age: 54
End: 2024-03-06
Payer: COMMERCIAL

## 2024-03-06 DIAGNOSIS — E55.9 VITAMIN D INSUFFICIENCY: ICD-10-CM

## 2024-03-06 DIAGNOSIS — E78.5 BORDERLINE HYPERLIPIDEMIA: ICD-10-CM

## 2024-03-06 LAB
25(OH)D3 SERPL-MCNC: 66 NG/ML (ref 30–100)
ALBUMIN SERPL BCP-MCNC: 4.6 G/DL (ref 3.4–5)
ALP SERPL-CCNC: 86 U/L (ref 33–110)
ALT SERPL W P-5'-P-CCNC: 16 U/L (ref 7–45)
ANION GAP SERPL CALC-SCNC: 12 MMOL/L (ref 10–20)
AST SERPL W P-5'-P-CCNC: 16 U/L (ref 9–39)
BILIRUB SERPL-MCNC: 0.5 MG/DL (ref 0–1.2)
BUN SERPL-MCNC: 17 MG/DL (ref 6–23)
CALCIUM SERPL-MCNC: 10.2 MG/DL (ref 8.6–10.3)
CHLORIDE SERPL-SCNC: 97 MMOL/L (ref 98–107)
CHOLEST SERPL-MCNC: 160 MG/DL (ref 0–199)
CHOLESTEROL/HDL RATIO: 3
CO2 SERPL-SCNC: 29 MMOL/L (ref 21–32)
CREAT SERPL-MCNC: 1 MG/DL (ref 0.5–1.05)
EGFRCR SERPLBLD CKD-EPI 2021: 67 ML/MIN/1.73M*2
ERYTHROCYTE [DISTWIDTH] IN BLOOD BY AUTOMATED COUNT: 13.7 % (ref 11.5–14.5)
GLUCOSE SERPL-MCNC: 160 MG/DL (ref 74–99)
HCT VFR BLD AUTO: 48.3 % (ref 36–46)
HDLC SERPL-MCNC: 53.1 MG/DL
HGB BLD-MCNC: 15.6 G/DL (ref 12–16)
LDLC SERPL CALC-MCNC: 77 MG/DL
MCH RBC QN AUTO: 29.5 PG (ref 26–34)
MCHC RBC AUTO-ENTMCNC: 32.3 G/DL (ref 32–36)
MCV RBC AUTO: 91 FL (ref 80–100)
NON HDL CHOLESTEROL: 107 MG/DL (ref 0–149)
NRBC BLD-RTO: ABNORMAL /100{WBCS}
PLATELET # BLD AUTO: 271 X10*3/UL (ref 150–450)
POTASSIUM SERPL-SCNC: 4.4 MMOL/L (ref 3.5–5.3)
PROT SERPL-MCNC: 7.4 G/DL (ref 6.4–8.2)
RBC # BLD AUTO: 5.29 X10*6/UL (ref 4–5.2)
SODIUM SERPL-SCNC: 134 MMOL/L (ref 136–145)
TRIGL SERPL-MCNC: 149 MG/DL (ref 0–149)
TSH SERPL-ACNC: 0.84 MIU/L (ref 0.44–3.98)
VLDL: 30 MG/DL (ref 0–40)
WBC # BLD AUTO: 10.4 X10*3/UL (ref 4.4–11.3)

## 2024-03-06 PROCEDURE — 36415 COLL VENOUS BLD VENIPUNCTURE: CPT

## 2024-03-06 PROCEDURE — 82306 VITAMIN D 25 HYDROXY: CPT

## 2024-03-12 ENCOUNTER — HOSPITAL ENCOUNTER (OUTPATIENT)
Dept: RADIOLOGY | Facility: HOSPITAL | Age: 54
Discharge: HOME | End: 2024-03-12
Payer: COMMERCIAL

## 2024-03-12 DIAGNOSIS — F17.210 CIGARETTE NICOTINE DEPENDENCE, UNCOMPLICATED: ICD-10-CM

## 2024-03-12 PROCEDURE — 71271 CT THORAX LUNG CANCER SCR C-: CPT

## 2024-03-13 DIAGNOSIS — R93.1 ELEVATED CORONARY ARTERY CALCIUM SCORE: Primary | ICD-10-CM

## 2024-03-14 LAB — NONINV COLON CA DNA+OCC BLD SCRN STL QL: POSITIVE

## 2024-03-15 DIAGNOSIS — Z12.11 SCREENING FOR COLON CANCER: Primary | ICD-10-CM

## 2024-03-15 DIAGNOSIS — R19.5 POSITIVE COLORECTAL CANCER SCREENING USING COLOGUARD TEST: ICD-10-CM

## 2024-03-25 DIAGNOSIS — Z12.11 SCREEN FOR COLON CANCER: Primary | ICD-10-CM

## 2024-03-25 RX ORDER — SOD SULF/POT CHLORIDE/MAG SULF 1.479 G
TABLET ORAL
Qty: 24 TABLET | Refills: 0 | Status: SHIPPED | OUTPATIENT
Start: 2024-03-25

## 2024-03-26 PROCEDURE — RXMED WILLOW AMBULATORY MEDICATION CHARGE

## 2024-03-27 ENCOUNTER — PHARMACY VISIT (OUTPATIENT)
Dept: PHARMACY | Facility: CLINIC | Age: 54
End: 2024-03-27
Payer: COMMERCIAL

## 2024-04-12 ENCOUNTER — APPOINTMENT (OUTPATIENT)
Dept: VASCULAR SURGERY | Facility: CLINIC | Age: 54
End: 2024-04-12
Payer: COMMERCIAL

## 2024-05-02 ENCOUNTER — PRE-ADMISSION TESTING (OUTPATIENT)
Dept: PREADMISSION TESTING | Facility: HOSPITAL | Age: 54
End: 2024-05-02
Payer: COMMERCIAL

## 2024-05-02 ENCOUNTER — ANESTHESIA EVENT (OUTPATIENT)
Dept: ANESTHESIOLOGY | Facility: HOSPITAL | Age: 54
End: 2024-05-02

## 2024-05-02 VITALS — HEIGHT: 63 IN | WEIGHT: 160 LBS | BODY MASS INDEX: 28.35 KG/M2

## 2024-05-02 PROBLEM — E78.5 HYPERLIPIDEMIA: Status: ACTIVE | Noted: 2024-05-02

## 2024-05-02 ASSESSMENT — DUKE ACTIVITY SCORE INDEX (DASI)
DASI METS SCORE: 3.9
CAN YOU DO HEAVY WORK AROUND THE HOUSE LIKE SCRUBBING FLOORS OR LIFTING AND MOVING HEAVY FURNITURE: NO
TOTAL_SCORE: 9.75
CAN YOU PARTICIPATE IN MODERATE RECREATIONAL ACTIVITIES LIKE GOLF, BOWLING, DANCING, DOUBLES TENNIS OR THROWING A BASEBALL OR FOOTBALL: NO
CAN YOU HAVE SEXUAL RELATIONS: YES
CAN YOU WALK INDOORS, SUCH AS AROUND YOUR HOUSE: YES
CAN YOU WALK A BLOCK OR TWO ON LEVEL GROUND: NO
CAN YOU DO YARD WORK LIKE RAKING LEAVES, WEEDING OR PUSHING A MOWER: NO
CAN YOU PARTICIPATE IN STRENOUS SPORTS LIKE SWIMMING, SINGLES TENNIS, FOOTBALL, BASKETBALL, OR SKIING: NO
CAN YOU TAKE CARE OF YOURSELF (EAT, DRESS, BATHE, OR USE TOILET): YES
CAN YOU DO MODERATE WORK AROUND THE HOUSE LIKE VACUUMING, SWEEPING FLOORS OR CARRYING GROCERIES: NO
CAN YOU CLIMB A FLIGHT OF STAIRS OR WALK UP A HILL: NO
CAN YOU DO LIGHT WORK AROUND THE HOUSE LIKE DUSTING OR WASHING DISHES: NO
CAN YOU RUN A SHORT DISTANCE: NO

## 2024-05-02 NOTE — ANESTHESIA PREPROCEDURE EVALUATION
Patient: Carmelita Mcdonald    Procedure Information    Date: 05/02/24  Reason: PAT         Relevant Problems   Anesthesia (within normal limits)      Cardiac   (+) HTN (hypertension)   (+) Hyperlipidemia      Pulmonary (within normal limits)      Neuro   (+) Anxiety      GI   (+) GERD (gastroesophageal reflux disease)   (+) Hiatal hernia      /Renal (within normal limits)      Liver (within normal limits)      Endocrine   (+) DM2 (diabetes mellitus, type 2) (Multi)      Hematology (within normal limits)      Musculoskeletal (within normal limits)      HEENT (within normal limits)      ID (within normal limits)      Skin (within normal limits)      GYN (within normal limits)       Clinical information reviewed:   Tobacco  Allergies  Meds   Med Hx  Surg Hx   Fam Hx  Soc Hx      Chart reviewed.  No clearances ordered.    There were no vitals filed for this visit.    Past Surgical History:   Procedure Laterality Date    HYSTERECTOMY  06/30/2020    Hysterectomy    SPINE SURGERY       Past Medical History:   Diagnosis Date    Cervical radiculopathy, acute 07/11/2023    History of tonsillectomy 07/11/2023    Nontraumatic tear of right rotator cuff 07/11/2023    Rotator cuff tear 07/11/2023       Current Outpatient Medications:     cholecalciferol (Vitamin D-3) 25 MCG (1000 UT) capsule, Take 1 capsule (25 mcg) by mouth once daily., Disp: , Rfl:     cyanocobalamin, vitamin B-12, (Vitamin B-12) 1,000 mcg tablet extended release, Take 1 tablet (1,000 mcg) by mouth once daily., Disp: , Rfl:     DULoxetine (Cymbalta) 30 mg DR capsule, Take 1 capsule (30 mg) by mouth once daily. Do not crush or chew., Disp: 90 capsule, Rfl: 3    empagliflozin (Jardiance) 10 mg, Take 1 tablet (10 mg) by mouth once daily., Disp: , Rfl:     famotidine (Pepcid) 40 mg tablet, Take 1 tablet (40 mg) by mouth once daily at bedtime., Disp: 90 tablet, Rfl: 1    lisinopril 5 mg tablet, Take 1 tablet (5 mg) by mouth once daily., Disp: 90 tablet, Rfl:  3    meloxicam (Mobic) 15 mg tablet, Take 1 tablet (15 mg) by mouth once daily as needed for moderate pain (4 - 6)., Disp: , Rfl:     metFORMIN XR (Glucophage-XR) 750 mg 24 hr tablet, 1 tablet (750 mg) 2 times a day., Disp: , Rfl:     OneTouch Verio Flex meter misc, Use as directed, Disp: , Rfl:     OneTouch Verio test strips strip, Use as instructed to check blood sugar 3 times daily. E11.65, Disp: , Rfl:     simvastatin (Zocor) 20 mg tablet, Take 1 tablet (20 mg) by mouth once daily at bedtime., Disp: 90 tablet, Rfl: 3    sod sulf-pot chloride-mag sulf (Sutab) 1.479-0.188- 0.225 gram tablet, Take 12 tabs the day before and 12 tabs the day of colonoscopy per included instructions. Take 1 tab every 1-2 mins. Finish tabs & 16 oz of water within 20 mins. Finish prep per instructions., Disp: 24 tablet, Rfl: 0    omeprazole (PriLOSEC) 20 mg DR capsule, Take 1 capsule (20 mg) by mouth once daily in the morning. Take before meals. Do not crush or chew. (Patient not taking: Reported on 5/2/2024), Disp: 90 capsule, Rfl: 1  Prior to Admission medications    Medication Sig Start Date End Date Taking? Authorizing Provider   cholecalciferol (Vitamin D-3) 25 MCG (1000 UT) capsule Take 1 capsule (25 mcg) by mouth once daily. 4/15/21  Yes Historical Provider, MD   cyanocobalamin, vitamin B-12, (Vitamin B-12) 1,000 mcg tablet extended release Take 1 tablet (1,000 mcg) by mouth once daily. 4/15/21  Yes Historical Provider, MD   DULoxetine (Cymbalta) 30 mg DR capsule Take 1 capsule (30 mg) by mouth once daily. Do not crush or chew. 8/24/23 8/23/24 Yes Mery Wall PA-C   empagliflozin (Jardiance) 10 mg Take 1 tablet (10 mg) by mouth once daily.   Yes Historical Provider, MD   famotidine (Pepcid) 40 mg tablet Take 1 tablet (40 mg) by mouth once daily at bedtime. 2/21/24 8/19/24 Yes Mery Wall PA-C   lisinopril 5 mg tablet Take 1 tablet (5 mg) by mouth once daily. 8/24/23 8/23/24 Yes Mery Wall PA-C   meloxicam (Mobic)  15 mg tablet Take 1 tablet (15 mg) by mouth once daily as needed for moderate pain (4 - 6). 6/30/20  Yes Historical Provider, MD   metFORMIN XR (Glucophage-XR) 750 mg 24 hr tablet 1 tablet (750 mg) 2 times a day. 5/5/20  Yes Historical Provider, MD Jhaveriuch Verio Flex meter misc Use as directed 6/1/23  Yes Historical Provider, MD   OneTouch Verio test strips strip Use as instructed to check blood sugar 3 times daily. E11.65   Yes Historical Provider, MD   simvastatin (Zocor) 20 mg tablet Take 1 tablet (20 mg) by mouth once daily at bedtime. 8/24/23 8/23/24 Yes Mery Wall PA-C   sod sulf-pot chloride-mag sulf (Sutab) 1.479-0.188- 0.225 gram tablet Take 12 tabs the day before and 12 tabs the day of colonoscopy per included instructions. Take 1 tab every 1-2 mins. Finish tabs & 16 oz of water within 20 mins. Finish prep per instructions. 3/25/24  Yes ANNE Paris   omeprazole (PriLOSEC) 20 mg DR capsule Take 1 capsule (20 mg) by mouth once daily in the morning. Take before meals. Do not crush or chew.  Patient not taking: Reported on 5/2/2024 7/12/23 2/21/24  Mery Wall PA-C   semaglutide (OZEMPIC) 1 mg/dose (4 mg/3 mL) pen injector Inject 1 mg under the skin once a week. 7/3/23 5/2/24  ANNE Benítez     Allergies   Allergen Reactions    Codeine Hallucinations     Social History     Tobacco Use    Smoking status: Every Day     Current packs/day: 1.00     Average packs/day: 1 pack/day for 38.3 years (38.3 ttl pk-yrs)     Types: Cigarettes     Start date: 1986    Smokeless tobacco: Never    Tobacco comments:     Shared decision making: Discussed LDCT. Interested in lung cancer screening and pursuing treatment options if abnormality found. Patient is in agreement with this   Substance Use Topics    Alcohol use: Yes     Comment: social         Chemistry    Lab Results   Component Value Date/Time     (L) 03/06/2024 1005    K 4.4 03/06/2024 1005    CL 97 (L) 03/06/2024 1005  "   CO2 29 03/06/2024 1005    BUN 17 03/06/2024 1005    CREATININE 1.00 03/06/2024 1005    Lab Results   Component Value Date/Time    CALCIUM 10.2 03/06/2024 1005    ALKPHOS 86 03/06/2024 1005    AST 16 03/06/2024 1005    ALT 16 03/06/2024 1005    BILITOT 0.5 03/06/2024 1005          Lab Results   Component Value Date/Time    WBC 10.4 03/06/2024 1005    HGB 15.6 03/06/2024 1005    HCT 48.3 (H) 03/06/2024 1005     03/06/2024 1005     No results found for: \"PROTIME\", \"PTT\", \"INR\"  No results found for this or any previous visit (from the past 4464 hour(s)).  No results found for this or any previous visit from the past 1095 days.    NPO Detail:  No data recorded     PHYSICAL EXAM    Anesthesia Plan  "

## 2024-05-02 NOTE — PREPROCEDURE INSTRUCTIONS
Medication List            Accurate as of May 2, 2024 10:16 AM. Always use your most recent med list.                cholecalciferol 25 MCG (1000 UT) capsule  Commonly known as: Vitamin D-3  Medication Adjustments for Surgery: Take morning of surgery with sip of water, no other fluids     cyanocobalamin (vitamin B-12) 1,000 mcg tablet extended release  Commonly known as: Vitamin B-12  Medication Adjustments for Surgery: Take morning of surgery with sip of water, no other fluids     DULoxetine 30 mg DR capsule  Commonly known as: Cymbalta  Take 1 capsule (30 mg) by mouth once daily. Do not crush or chew.  Medication Adjustments for Surgery: Take morning of surgery with sip of water, no other fluids     famotidine 40 mg tablet  Commonly known as: Pepcid  Take 1 tablet (40 mg) by mouth once daily at bedtime.  Medication Adjustments for Surgery: Take morning of surgery with sip of water, no other fluids     Jardiance 10 mg  Generic drug: empagliflozin  Medication Adjustments for Surgery: Stop 3 days before surgery     lisinopril 5 mg tablet  Take 1 tablet (5 mg) by mouth once daily.  Medication Adjustments for Surgery: Continue until night before surgery     meloxicam 15 mg tablet  Commonly known as: Mobic  Medication Adjustments for Surgery: Stop 1 day before surgery     metFORMIN  mg 24 hr tablet  Commonly known as: Glucophage-XR  Medication Adjustments for Surgery: Take morning of surgery with sip of water, no other fluids     omeprazole 20 mg DR capsule  Commonly known as: PriLOSEC  Take 1 capsule (20 mg) by mouth once daily in the morning. Take before meals. Do not crush or chew.  Medication Adjustments for Surgery: Take morning of surgery with sip of water, no other fluids     OneTouch Verio Flex meter misc  Generic drug: blood-glucose meter     OneTouch Verio test strips strip  Generic drug: blood sugar diagnostic  Medication Adjustments for Surgery: Take morning of surgery with sip of water, no other  fluids     simvastatin 20 mg tablet  Commonly known as: Zocor  Take 1 tablet (20 mg) by mouth once daily at bedtime.  Medication Adjustments for Surgery: Take morning of surgery with sip of water, no other fluids     Sutab 1.479-0.188- 0.225 gram tablet  Generic drug: sod sulf-pot chloride-mag sulf  Take 12 tabs the day before and 12 tabs the day of colonoscopy per included instructions. Take 1 tab every 1-2 mins. Finish tabs & 16 oz of water within 20 mins. Finish prep per instructions.  Medication Adjustments for Surgery: Take morning of surgery with sip of water, no other fluids                              NPO Instructions:    NO SOLID FOOD after breakfast the day prior to your procedure-ONLY CLEAR LIQUIDS  You may have clear liquids up to THREE hours prior to procedure  Start prep at aprox 5p the night before your procedure    Additional Instructions:     Review your medication instructions, take indicated medications  You may have clear liquids until TWO hours before surgery/procedure.  This includes water, black tea/coffee, (no milk or cream) apple juice and electrolyte drinks (Gatorade)  You may chew gum up to TWO hours before your surgery/procedure  Wear  comfortable loose fitting clothing  Do not use moisturizers, creams, lotions or perfume  All jewelry and valuables should be left at home    We will call you the business day before your procedure between 1-3p to confirm your procedure and arrival time  Please park in the back of the hospital, in the ED parking and proceed to the second floor  Please make arrangements to have a responsible adult take you home and stay with you for 24 hours  Please bring your ID and insurance card to your procedure  When checked in to the outpatient unit, you will be asked to change into a hospital gown and remove all clothing, including underwear  An IV will be inserted   Your family or  will be asked to wait in the waiting room or cafeteria and will be notified when  able to come sit with you  Please call [phone] with any further questions

## 2024-05-13 RX ORDER — SODIUM CHLORIDE, SODIUM LACTATE, POTASSIUM CHLORIDE, CALCIUM CHLORIDE 600; 310; 30; 20 MG/100ML; MG/100ML; MG/100ML; MG/100ML
20 INJECTION, SOLUTION INTRAVENOUS CONTINUOUS
OUTPATIENT
Start: 2024-05-13

## 2024-05-20 ENCOUNTER — APPOINTMENT (OUTPATIENT)
Dept: GASTROENTEROLOGY | Facility: HOSPITAL | Age: 54
End: 2024-05-20
Payer: COMMERCIAL

## 2024-05-24 ENCOUNTER — OFFICE VISIT (OUTPATIENT)
Dept: PRIMARY CARE | Facility: CLINIC | Age: 54
End: 2024-05-24
Payer: COMMERCIAL

## 2024-05-24 VITALS
WEIGHT: 157.4 LBS | SYSTOLIC BLOOD PRESSURE: 100 MMHG | HEIGHT: 63 IN | DIASTOLIC BLOOD PRESSURE: 60 MMHG | TEMPERATURE: 97.6 F | OXYGEN SATURATION: 95 % | BODY MASS INDEX: 27.89 KG/M2 | HEART RATE: 64 BPM

## 2024-05-24 DIAGNOSIS — Z78.0 ASYMPTOMATIC POSTMENOPAUSAL STATE: Primary | ICD-10-CM

## 2024-05-24 DIAGNOSIS — M79.672 LEFT FOOT PAIN: ICD-10-CM

## 2024-05-24 DIAGNOSIS — R52 PAIN: ICD-10-CM

## 2024-05-24 DIAGNOSIS — N39.0 URINARY TRACT INFECTION WITH HEMATURIA, SITE UNSPECIFIED: ICD-10-CM

## 2024-05-24 DIAGNOSIS — R31.9 URINARY TRACT INFECTION WITH HEMATURIA, SITE UNSPECIFIED: ICD-10-CM

## 2024-05-24 LAB
POC APPEARANCE, URINE: CLEAR
POC BILIRUBIN, URINE: NEGATIVE
POC BLOOD, URINE: ABNORMAL
POC COLOR, URINE: YELLOW
POC GLUCOSE, URINE: ABNORMAL MG/DL
POC KETONES, URINE: NEGATIVE MG/DL
POC LEUKOCYTES, URINE: NEGATIVE
POC NITRITE,URINE: NEGATIVE
POC PH, URINE: 5 PH
POC PROTEIN, URINE: NEGATIVE MG/DL
POC SPECIFIC GRAVITY, URINE: <=1.005
POC UROBILINOGEN, URINE: 0.2 EU/DL

## 2024-05-24 PROCEDURE — 3074F SYST BP LT 130 MM HG: CPT | Performed by: PHYSICIAN ASSISTANT

## 2024-05-24 PROCEDURE — 87086 URINE CULTURE/COLONY COUNT: CPT

## 2024-05-24 PROCEDURE — 3078F DIAST BP <80 MM HG: CPT | Performed by: PHYSICIAN ASSISTANT

## 2024-05-24 PROCEDURE — 3048F LDL-C <100 MG/DL: CPT | Performed by: PHYSICIAN ASSISTANT

## 2024-05-24 PROCEDURE — 99214 OFFICE O/P EST MOD 30 MIN: CPT | Performed by: PHYSICIAN ASSISTANT

## 2024-05-24 PROCEDURE — 81003 URINALYSIS AUTO W/O SCOPE: CPT | Performed by: PHYSICIAN ASSISTANT

## 2024-05-24 PROCEDURE — 4010F ACE/ARB THERAPY RXD/TAKEN: CPT | Performed by: PHYSICIAN ASSISTANT

## 2024-05-24 RX ORDER — MELOXICAM 15 MG/1
15 TABLET ORAL DAILY PRN
Qty: 90 TABLET | Refills: 1 | Status: SHIPPED | OUTPATIENT
Start: 2024-05-24

## 2024-05-24 ASSESSMENT — PATIENT HEALTH QUESTIONNAIRE - PHQ9
2. FEELING DOWN, DEPRESSED OR HOPELESS: NOT AT ALL
SUM OF ALL RESPONSES TO PHQ9 QUESTIONS 1 AND 2: 0
1. LITTLE INTEREST OR PLEASURE IN DOING THINGS: NOT AT ALL

## 2024-05-24 NOTE — PATIENT INSTRUCTIONS
A1c [facility] endo josi Braga 5/15/24 7.8%  Micro [facility] 7/10/23 due July 2024    Labs 3/6/24   Kacey 12/8/23   Dexa 2/24/22 ordered  Colonoscopy - cologuard + 3/15/22 due 2025

## 2024-05-24 NOTE — PROGRESS NOTES
Subjective     HPI   Carmelita Mcdonald is a 53 y.o. year old female patient with presenting to clinic with concern for   Chief Complaint   Patient presents with    UTI     Flow is not good. Comes and goes.        Carmelita presents to clinic for follow up.      Ozempic caused issues, Endo considering PO version Rybelsus  Can't increase Jardiance d/t concern for BP. Following with endo      Recent Results (from the past 2 hour(s))   POCT UA Automated manually resulted    Collection Time: 05/24/24  9:39 AM   Result Value Ref Range    POC Color, Urine Yellow Straw, Yellow, Light-Yellow    POC Appearance, Urine Clear Clear    POC Glucose, Urine >=1000 (4+) (A) NEGATIVE mg/dl    POC Bilirubin, Urine NEGATIVE NEGATIVE    POC Ketones, Urine NEGATIVE NEGATIVE mg/dl    POC Specific Gravity, Urine <=1.005 1.005 - 1.035    POC Blood, Urine TRACE-Intact (A) NEGATIVE    POC PH, Urine 5.0 No Reference Range Established PH    POC Protein, Urine NEGATIVE NEGATIVE, 30 (1+) mg/dl    POC Urobilinogen, Urine 0.2 0.2, 1.0 EU/DL    Poc Nitrite, Urine NEGATIVE NEGATIVE    POC Leukocytes, Urine NEGATIVE NEGATIVE         Hx DM and GERD.  GERD is improved off of ozempic. Poor evening dietary habits are contributing to this.   A1c due today KG    Forgets to take evening dose of metformin often. Discussed ways to improve compliance.    Had gastroenteritis and then COVID.   Stopped ozempic Jan 31st d/t nauseous, GERD, fatigue.   Has follow up with Endocrinology     No pap- Hx hysterectomy.  A1c 7.3% [facility] endocrinologist. 4/20/2023 7.1%, started ozempic recently by endocrinology but now insurance is not covering this dose- has been off for 2 weeks.     Patient Active Problem List   Diagnosis    BPPV (benign paroxysmal positional vertigo)    Anxiety    Allergic rhinitis    Chronic fatigue    DM2 (diabetes mellitus, type 2) (Multi)    Dyslipidemia    Herniated cervical disc    Hiatal hernia    HTN (hypertension)    GERD (gastroesophageal reflux disease)     Cigarette nicotine dependence without complication    Stage 1 chronic kidney disease    Vitamin D deficiency    Hyperlipidemia       Past Medical History:   Diagnosis Date    Cervical radiculopathy, acute 07/11/2023    History of tonsillectomy 07/11/2023    Nontraumatic tear of right rotator cuff 07/11/2023    Rotator cuff tear 07/11/2023      Past Surgical History:   Procedure Laterality Date    HYSTERECTOMY  06/30/2020    Hysterectomy    SPINE SURGERY        Family History   Problem Relation Name Age of Onset    Diabetes Mother      Hypertension Mother      Hyperlipidemia Mother      Breast cancer Mother  54    Diabetes Father      Hypertension Father        Social History     Tobacco Use    Smoking status: Every Day     Current packs/day: 1.00     Average packs/day: 1 pack/day for 38.4 years (38.4 ttl pk-yrs)     Types: Cigarettes     Start date: 1986    Smokeless tobacco: Never    Tobacco comments:     Shared decision making: Discussed LDCT. Interested in lung cancer screening and pursuing treatment options if abnormality found. Patient is in agreement with this   Substance Use Topics    Alcohol use: Yes     Comment: social        Current Outpatient Medications:     cholecalciferol (Vitamin D-3) 25 MCG (1000 UT) capsule, Take 1 capsule (25 mcg) by mouth once daily., Disp: , Rfl:     cyanocobalamin, vitamin B-12, (Vitamin B-12) 1,000 mcg tablet extended release, Take 1 tablet (1,000 mcg) by mouth once daily., Disp: , Rfl:     DULoxetine (Cymbalta) 30 mg DR capsule, Take 1 capsule (30 mg) by mouth once daily. Do not crush or chew., Disp: 90 capsule, Rfl: 3    empagliflozin (Jardiance) 10 mg, Take 1 tablet (10 mg) by mouth once daily., Disp: , Rfl:     famotidine (Pepcid) 40 mg tablet, Take 1 tablet (40 mg) by mouth once daily at bedtime., Disp: 90 tablet, Rfl: 1    lisinopril 5 mg tablet, Take 1 tablet (5 mg) by mouth once daily., Disp: 90 tablet, Rfl: 3    metFORMIN XR (Glucophage-XR) 750 mg 24 hr tablet, 1  "tablet (750 mg) 2 times a day., Disp: , Rfl:     OneTouch Verio Flex meter misc, Use as directed, Disp: , Rfl:     OneTouch Verio test strips strip, Use as instructed to check blood sugar 3 times daily. E11.65, Disp: , Rfl:     simvastatin (Zocor) 20 mg tablet, Take 1 tablet (20 mg) by mouth once daily at bedtime., Disp: 90 tablet, Rfl: 3    sod sulf-pot chloride-mag sulf (Sutab) 1.479-0.188- 0.225 gram tablet, Take 12 tabs the day before and 12 tabs the day of colonoscopy per included instructions. Take 1 tab every 1-2 mins. Finish tabs & 16 oz of water within 20 mins. Finish prep per instructions., Disp: 24 tablet, Rfl: 0    meloxicam (Mobic) 15 mg tablet, Take 1 tablet (15 mg) by mouth once daily as needed for moderate pain (4 - 6)., Disp: 90 tablet, Rfl: 1    omeprazole (PriLOSEC) 20 mg DR capsule, Take 1 capsule (20 mg) by mouth once daily in the morning. Take before meals. Do not crush or chew., Disp: 90 capsule, Rfl: 1     Review of Systems  Constitutional: Denies fever  HEENT: Denies ST, earache  CVS: Denies Chest pain  Pulmonary: Denies wheezing, SOB  GI: Denies N/V  : Denies dysuria  Musculoskeletal:  Denies myalgia  Neuro: Denies focal weakness or numbness.  Skin: Denies Rashes.  *Review of Systems is negative unless otherwise mentioned in HPI or ROS above.    Objective   /60   Pulse 64   Temp 36.4 °C (97.6 °F)   Ht 1.6 m (5' 3\")   Wt 71.4 kg (157 lb 6.4 oz)   SpO2 95%   BMI 27.88 kg/m²  reviewed Body mass index is 27.88 kg/m².     Physical Exam  Constitutional: NAD.  Resting comfortably.  Head: Atraumatic, normocephalic.  ENT: Moist oral mucosa. Nasal mucosa wnl.   Cardiac: Regular rate & rhythm.   Pulmonary: Lungs clear bilat  GI: Soft, Nontender, nondistended.   Musculoskeletal: No peripheral edema. Mild dorsolateral foot pain with plantar flexion  Skin: No evidence of trauma. No rashes  Psych: Intact judgement and insight.    .Assessment/Plan   Problem List Items Addressed This Visit  "   None  Visit Diagnoses         Codes    Asymptomatic postmenopausal state    -  Primary Z78.0    Relevant Orders    XR DEXA bone density    Pain     R52    Relevant Medications    meloxicam (Mobic) 15 mg tablet    Urinary tract infection with hematuria, site unspecified     N39.0, R31.9    Relevant Orders    Urine Culture    POCT UA Automated manually resulted (Completed)    Left foot pain     M79.67

## 2024-05-25 LAB — BACTERIA UR CULT: NORMAL

## 2024-05-29 ENCOUNTER — HOSPITAL ENCOUNTER (OUTPATIENT)
Dept: RADIOLOGY | Facility: HOSPITAL | Age: 54
Discharge: HOME | End: 2024-05-29
Payer: COMMERCIAL

## 2024-05-29 DIAGNOSIS — M79.672 LEFT FOOT PAIN: ICD-10-CM

## 2024-05-29 PROCEDURE — 73630 X-RAY EXAM OF FOOT: CPT | Mod: LEFT SIDE | Performed by: RADIOLOGY

## 2024-05-29 PROCEDURE — 73630 X-RAY EXAM OF FOOT: CPT | Mod: LT

## 2024-05-31 RX ORDER — ORAL SEMAGLUTIDE 3 MG/1
3 TABLET ORAL
COMMUNITY
Start: 2024-05-23

## 2024-05-31 RX ORDER — METFORMIN HYDROCHLORIDE 500 MG/1
1000 TABLET, EXTENDED RELEASE ORAL 2 TIMES DAILY
COMMUNITY
Start: 2024-05-26

## 2024-05-31 RX ORDER — EMPAGLIFLOZIN 25 MG/1
25 TABLET, FILM COATED ORAL
COMMUNITY

## 2024-06-03 ENCOUNTER — OFFICE VISIT (OUTPATIENT)
Dept: CARDIOLOGY | Facility: CLINIC | Age: 54
End: 2024-06-03
Payer: COMMERCIAL

## 2024-06-03 ENCOUNTER — HOSPITAL ENCOUNTER (OUTPATIENT)
Dept: CARDIOLOGY | Facility: HOSPITAL | Age: 54
Discharge: HOME | End: 2024-06-03
Payer: COMMERCIAL

## 2024-06-03 VITALS
OXYGEN SATURATION: 96 % | BODY MASS INDEX: 28.35 KG/M2 | WEIGHT: 160 LBS | HEART RATE: 93 BPM | HEIGHT: 63 IN | SYSTOLIC BLOOD PRESSURE: 127 MMHG | DIASTOLIC BLOOD PRESSURE: 73 MMHG

## 2024-06-03 DIAGNOSIS — R94.31 ABNORMAL EKG: ICD-10-CM

## 2024-06-03 DIAGNOSIS — R53.83 FATIGUE, UNSPECIFIED TYPE: ICD-10-CM

## 2024-06-03 DIAGNOSIS — I10 HYPERTENSION, UNSPECIFIED TYPE: ICD-10-CM

## 2024-06-03 DIAGNOSIS — E78.2 MIXED HYPERLIPIDEMIA: ICD-10-CM

## 2024-06-03 DIAGNOSIS — E11.9 TYPE 2 DIABETES MELLITUS WITHOUT COMPLICATION, WITHOUT LONG-TERM CURRENT USE OF INSULIN (MULTI): ICD-10-CM

## 2024-06-03 DIAGNOSIS — I10 HYPERTENSION, UNSPECIFIED TYPE: Primary | ICD-10-CM

## 2024-06-03 DIAGNOSIS — R07.89 CHEST PRESSURE: ICD-10-CM

## 2024-06-03 DIAGNOSIS — R06.02 SHORTNESS OF BREATH: ICD-10-CM

## 2024-06-03 PROBLEM — E78.5 DYSLIPIDEMIA: Status: RESOLVED | Noted: 2023-07-11 | Resolved: 2024-06-03

## 2024-06-03 LAB
ATRIAL RATE: 81 BPM
P AXIS: 64 DEGREES
P OFFSET: 198 MS
P ONSET: 144 MS
PR INTERVAL: 152 MS
Q ONSET: 220 MS
QRS COUNT: 14 BEATS
QRS DURATION: 74 MS
QT INTERVAL: 370 MS
QTC CALCULATION(BAZETT): 429 MS
QTC FREDERICIA: 408 MS
R AXIS: 80 DEGREES
T AXIS: 66 DEGREES
T OFFSET: 405 MS
VENTRICULAR RATE: 81 BPM

## 2024-06-03 PROCEDURE — 3074F SYST BP LT 130 MM HG: CPT | Performed by: NURSE PRACTITIONER

## 2024-06-03 PROCEDURE — 3048F LDL-C <100 MG/DL: CPT | Performed by: NURSE PRACTITIONER

## 2024-06-03 PROCEDURE — 3078F DIAST BP <80 MM HG: CPT | Performed by: NURSE PRACTITIONER

## 2024-06-03 PROCEDURE — 4010F ACE/ARB THERAPY RXD/TAKEN: CPT | Performed by: NURSE PRACTITIONER

## 2024-06-03 PROCEDURE — 93005 ELECTROCARDIOGRAM TRACING: CPT

## 2024-06-03 PROCEDURE — 99204 OFFICE O/P NEW MOD 45 MIN: CPT | Performed by: NURSE PRACTITIONER

## 2024-06-03 RX ORDER — ATORVASTATIN CALCIUM 80 MG/1
80 TABLET, FILM COATED ORAL DAILY
Qty: 30 TABLET | Refills: 11 | Status: SHIPPED | OUTPATIENT
Start: 2024-06-03 | End: 2025-06-03

## 2024-06-03 NOTE — H&P (VIEW-ONLY)
Primary Care Physician: Mery Wall PA-C  Primary Cardiologist:      Date of Visit: 2024  9:40 AM EDT  Location of visit:  [facility]   Type of Visit: New Patient        Chief Complaint   Patient presents with    New Patient Visit     Here for fatigue        HPI / Summary:   Carmelita Mcdonald is a 53 y.o. female who presents to establish cardiac care    Past medical history significant for  HTN, HLD, T2DM not requiring insulin       She denies cardiac history   C/o fatigue ~ 1 year,  no chest discomfort or unusual dyspnea   She has some chest pressure, initially though to be indigestion,  once all night waking her from sleep.  Episodes are infrequent and self limiting     SOC:  1.5 PPD cigarettes x40 years   Works as caregiver       FH:   Mom- TIA, MI PCI, PAD,  alive   Dad - MI, CABG, CVA  age 74   Sister CVA age 30 - alive         12 system review is negative except as noted above       Medical History:   Past Medical History:   Diagnosis Date    Cervical radiculopathy, acute 2023    History of tonsillectomy 2023    Nontraumatic tear of right rotator cuff 2023    Rotator cuff tear 2023       Surgical History:   Past Surgical History:   Procedure Laterality Date    HYSTERECTOMY  2020    Hysterectomy    SPINE SURGERY         Family History:   Family History   Problem Relation Name Age of Onset    Diabetes Mother      Hypertension Mother      Hyperlipidemia Mother      Breast cancer Mother  54    Diabetes Father      Hypertension Father         Social History:   Tobacco Use: High Risk (2024)    Patient History     Smoking Tobacco Use: Every Day     Smokeless Tobacco Use: Never     Passive Exposure: Not on file             MEDICATIONS:   Current Outpatient Medications   Medication Instructions    B complex-vitamin C-folic acid (Nephro-Robinson Rx) 1- mg-mg-mcg tablet 1 tablet, oral, Daily with breakfast    cholecalciferol (VITAMIN D-3) 25 mcg, oral, Daily     cyanocobalamin, vitamin B-12, (Vitamin B-12) 1,000 mcg tablet extended release 1 tablet, oral, Daily    DULoxetine (CYMBALTA) 30 mg, oral, Daily, Do not crush or chew.    famotidine (PEPCID) 40 mg, oral, Nightly    Jardiance 25 mg, oral, Daily with breakfast    lisinopril 5 mg, oral, Daily    meloxicam (MOBIC) 15 mg, oral, Daily PRN    metFORMIN XR (GLUCOPHAGE-XR) 1,000 mg, oral, 2 times daily    OneTouch Verio Flex meter misc Use as directed    OneTouch Verio test strips strip Use as instructed to check blood sugar 3 times daily. E11.65    Rybelsus 3 mg, oral    simvastatin (ZOCOR) 20 mg, oral, Nightly    sod sulf-pot chloride-mag sulf (Sutab) 1.479-0.188- 0.225 gram tablet Take 12 tabs the day before and 12 tabs the day of colonoscopy per included instructions. Take 1 tab every 1-2 mins. Finish tabs & 16 oz of water within 20 mins. Finish prep per instructions.         IMAGING REVIEWED:     CT lung screening 3/12/2024  IMPRESSION:  1. Few small bilateral noncalcified pulmonary nodules measuring up to  4 mm, likely benign. Continued screening with low-dose noncontrast  chest CT in 12 months (from current date) is recommended.  2. Estimated coronary artery calcium score is 260*. This patient is  at moderate to high risk for future cardiovascular events such as  myocardial infarction, ischemic stroke, and cardiovascular death. If  this patient is currently not seeing a cardiologist, consider  referral to the [facility] Cardiovascular Prevention Program (  Preventive Cardiology): Place EPIC referral to 'Cardiology Prevention  Program.  3. Mild apical emphysema.        LABS:  CBC:   Lab Results   Component Value Date    WBC 10.4 03/06/2024    RBC 5.29 (H) 03/06/2024    HGB 15.6 03/06/2024    HCT 48.3 (H) 03/06/2024    MCV 91 03/06/2024    MCH 29.5 03/06/2024    MCHC 32.3 03/06/2024    RDW 13.7 03/06/2024     03/06/2024     CBC with Differential:    Lab Results   Component Value Date    WBC 10.4 03/06/2024  "   RBC 5.29 (H) 03/06/2024    HGB 15.6 03/06/2024    HCT 48.3 (H) 03/06/2024     03/06/2024    MCV 91 03/06/2024    MCH 29.5 03/06/2024    MCHC 32.3 03/06/2024    RDW 13.7 03/06/2024    NRBC  03/06/2024      Comment:      Not Measured    LYMPHOPCT 25.5 02/07/2023    MONOPCT 5.4 02/07/2023    EOSPCT 0.8 02/07/2023    BASOPCT 1.0 02/07/2023    MONOSABS 0.59 02/07/2023    LYMPHSABS 2.80 02/07/2023    EOSABS 0.09 02/07/2023    BASOSABS 0.11 (H) 02/07/2023     CMP:    Lab Results   Component Value Date     (L) 03/06/2024    K 4.4 03/06/2024    CL 97 (L) 03/06/2024    CO2 29 03/06/2024    BUN 17 03/06/2024    CREATININE 1.00 03/06/2024    GLUCOSE 160 (H) 03/06/2024    PROT 7.4 03/06/2024    CALCIUM 10.2 03/06/2024    BILITOT 0.5 03/06/2024    ALKPHOS 86 03/06/2024    AST 16 03/06/2024    ALT 16 03/06/2024     BMP:    Lab Results   Component Value Date     (L) 03/06/2024    K 4.4 03/06/2024    CL 97 (L) 03/06/2024    CO2 29 03/06/2024    BUN 17 03/06/2024    CREATININE 1.00 03/06/2024    CALCIUM 10.2 03/06/2024    GLUCOSE 160 (H) 03/06/2024     Magnesium:No results found for: \"MG\"  Troponin:  No results found for: \"TROPHS\"  BNP: No results found for: \"BNP\"    Lipid Panel:  Lab Results   Component Value Date    HDL 53.1 03/06/2024    CHHDL 3.0 03/06/2024    VLDL 30 03/06/2024    TRIG 149 03/06/2024    NHDL 107 03/06/2024        Lab work and imaging results independently reviewed by me         Visit Vitals  /73   Pulse 93   Ht 1.6 m (5' 3\")   Wt 72.6 kg (160 lb)   SpO2 96%   BMI 28.34 kg/m²   OB Status Hysterectomy   Smoking Status Every Day   BSA 1.8 m²          ECG dated 6/03/2024 independently reviewed   NSR 81        Constitutional:       Appearance: Healthy appearance. Not in distress.   Eyes:      Conjunctiva/sclera: Conjunctivae normal.   Neck:      Vascular: JVD normal.   Pulmonary:      Effort: Pulmonary effort is normal.      Breath sounds: Normal breath sounds.   Cardiovascular:      PMI " at left midclavicular line. Normal rate. Regular rhythm. Normal S1. Normal S2.       Murmurs: There is no murmur.      No rub.   Pulses:     Intact distal pulses.   Edema:     Peripheral edema absent.   Abdominal:      General: Bowel sounds are normal.   Musculoskeletal:      Cervical back: Neck supple. Skin:     General: Skin is warm and dry.   Neurological:      Mental Status: Alert and oriented to person, place and time.           Problem List Items Addressed This Visit             ICD-10-CM    DM2 (diabetes mellitus, type 2) (Multi) E11.9    Relevant Medications    atorvastatin (Lipitor) 80 mg tablet    HTN (hypertension) - Primary I10    Relevant Orders    ECG 12 lead (Ancillary Performed)    Hyperlipidemia E78.5    Relevant Medications    atorvastatin (Lipitor) 80 mg tablet     Other Visit Diagnoses         Codes    Abnormal EKG     R94.31    Relevant Orders    Transthoracic echo (TTE) complete    Nuclear Stress Test    Shortness of breath     R06.02    Relevant Orders    Transthoracic echo (TTE) complete    Nuclear Stress Test    Fatigue, unspecified type     R53.83    Relevant Orders    Transthoracic echo (TTE) complete    Nuclear Stress Test    Chest pressure     R07.89    Relevant Orders    Transthoracic echo (TTE) complete    Nuclear Stress Test            Echocardiogram r/o structural disease   Pharm / MPI stress testing with atypical chest pain and risk factors     Most recent LDL 77 mg/dL with T2DM,  ASCVD on CT chest   --Change Simvastatin to Atorvastatin      We discussed the importance of complete smoking cessation   F/U 4-6 weeks to review test results         06/03/24 at 10:02 AM - JUAN DAVID Love-CNP        Followup Appts:  Future Appointments   Date Time Provider Department Center   6/26/2024  2:00 PM [facility]   7/8/2024 11:45 AM Salvatore Arnold MD [address]   11/27/2024  9:00 AM Mery Wall PA-C [id number]

## 2024-06-03 NOTE — PATIENT INSTRUCTIONS
Stop simvastatin wait 7 days   Start the atorvastatin   If you develop muscle aches or worsening fatigue, malaise stop it for 5 days resume it at half 40 mg   If you still have side effects, call the office

## 2024-06-03 NOTE — PROGRESS NOTES
Primary Care Physician: Mery Wall PA-C  Primary Cardiologist:      Date of Visit: 2024  9:40 AM EDT  Location of visit:  [facility]   Type of Visit: New Patient        Chief Complaint   Patient presents with    New Patient Visit     Here for fatigue        HPI / Summary:   Carmelita Mcdonald is a 53 y.o. female who presents to establish cardiac care    Past medical history significant for  HTN, HLD, T2DM not requiring insulin       She denies cardiac history   C/o fatigue ~ 1 year,  no chest discomfort or unusual dyspnea   She has some chest pressure, initially though to be indigestion,  once all night waking her from sleep.  Episodes are infrequent and self limiting     SOC:  1.5 PPD cigarettes x40 years   Works as caregiver       FH:   Mom- TIA, MI PCI, PAD,  alive   Dad - MI, CABG, CVA  age 74   Sister CVA age 30 - alive         12 system review is negative except as noted above       Medical History:   Past Medical History:   Diagnosis Date    Cervical radiculopathy, acute 2023    History of tonsillectomy 2023    Nontraumatic tear of right rotator cuff 2023    Rotator cuff tear 2023       Surgical History:   Past Surgical History:   Procedure Laterality Date    HYSTERECTOMY  2020    Hysterectomy    SPINE SURGERY         Family History:   Family History   Problem Relation Name Age of Onset    Diabetes Mother      Hypertension Mother      Hyperlipidemia Mother      Breast cancer Mother  54    Diabetes Father      Hypertension Father         Social History:   Tobacco Use: High Risk (2024)    Patient History     Smoking Tobacco Use: Every Day     Smokeless Tobacco Use: Never     Passive Exposure: Not on file             MEDICATIONS:   Current Outpatient Medications   Medication Instructions    B complex-vitamin C-folic acid (Nephro-Robinson Rx) 1- mg-mg-mcg tablet 1 tablet, oral, Daily with breakfast    cholecalciferol (VITAMIN D-3) 25 mcg, oral, Daily     cyanocobalamin, vitamin B-12, (Vitamin B-12) 1,000 mcg tablet extended release 1 tablet, oral, Daily    DULoxetine (CYMBALTA) 30 mg, oral, Daily, Do not crush or chew.    famotidine (PEPCID) 40 mg, oral, Nightly    Jardiance 25 mg, oral, Daily with breakfast    lisinopril 5 mg, oral, Daily    meloxicam (MOBIC) 15 mg, oral, Daily PRN    metFORMIN XR (GLUCOPHAGE-XR) 1,000 mg, oral, 2 times daily    OneTouch Verio Flex meter misc Use as directed    OneTouch Verio test strips strip Use as instructed to check blood sugar 3 times daily. E11.65    Rybelsus 3 mg, oral    simvastatin (ZOCOR) 20 mg, oral, Nightly    sod sulf-pot chloride-mag sulf (Sutab) 1.479-0.188- 0.225 gram tablet Take 12 tabs the day before and 12 tabs the day of colonoscopy per included instructions. Take 1 tab every 1-2 mins. Finish tabs & 16 oz of water within 20 mins. Finish prep per instructions.         IMAGING REVIEWED:     CT lung screening 3/12/2024  IMPRESSION:  1. Few small bilateral noncalcified pulmonary nodules measuring up to  4 mm, likely benign. Continued screening with low-dose noncontrast  chest CT in 12 months (from current date) is recommended.  2. Estimated coronary artery calcium score is 260*. This patient is  at moderate to high risk for future cardiovascular events such as  myocardial infarction, ischemic stroke, and cardiovascular death. If  this patient is currently not seeing a cardiologist, consider  referral to the [facility] Cardiovascular Prevention Program (  Preventive Cardiology): Place EPIC referral to 'Cardiology Prevention  Program.  3. Mild apical emphysema.        LABS:  CBC:   Lab Results   Component Value Date    WBC 10.4 03/06/2024    RBC 5.29 (H) 03/06/2024    HGB 15.6 03/06/2024    HCT 48.3 (H) 03/06/2024    MCV 91 03/06/2024    MCH 29.5 03/06/2024    MCHC 32.3 03/06/2024    RDW 13.7 03/06/2024     03/06/2024     CBC with Differential:    Lab Results   Component Value Date    WBC 10.4 03/06/2024  "   RBC 5.29 (H) 03/06/2024    HGB 15.6 03/06/2024    HCT 48.3 (H) 03/06/2024     03/06/2024    MCV 91 03/06/2024    MCH 29.5 03/06/2024    MCHC 32.3 03/06/2024    RDW 13.7 03/06/2024    NRBC  03/06/2024      Comment:      Not Measured    LYMPHOPCT 25.5 02/07/2023    MONOPCT 5.4 02/07/2023    EOSPCT 0.8 02/07/2023    BASOPCT 1.0 02/07/2023    MONOSABS 0.59 02/07/2023    LYMPHSABS 2.80 02/07/2023    EOSABS 0.09 02/07/2023    BASOSABS 0.11 (H) 02/07/2023     CMP:    Lab Results   Component Value Date     (L) 03/06/2024    K 4.4 03/06/2024    CL 97 (L) 03/06/2024    CO2 29 03/06/2024    BUN 17 03/06/2024    CREATININE 1.00 03/06/2024    GLUCOSE 160 (H) 03/06/2024    PROT 7.4 03/06/2024    CALCIUM 10.2 03/06/2024    BILITOT 0.5 03/06/2024    ALKPHOS 86 03/06/2024    AST 16 03/06/2024    ALT 16 03/06/2024     BMP:    Lab Results   Component Value Date     (L) 03/06/2024    K 4.4 03/06/2024    CL 97 (L) 03/06/2024    CO2 29 03/06/2024    BUN 17 03/06/2024    CREATININE 1.00 03/06/2024    CALCIUM 10.2 03/06/2024    GLUCOSE 160 (H) 03/06/2024     Magnesium:No results found for: \"MG\"  Troponin:  No results found for: \"TROPHS\"  BNP: No results found for: \"BNP\"    Lipid Panel:  Lab Results   Component Value Date    HDL 53.1 03/06/2024    CHHDL 3.0 03/06/2024    VLDL 30 03/06/2024    TRIG 149 03/06/2024    NHDL 107 03/06/2024        Lab work and imaging results independently reviewed by me         Visit Vitals  /73   Pulse 93   Ht 1.6 m (5' 3\")   Wt 72.6 kg (160 lb)   SpO2 96%   BMI 28.34 kg/m²   OB Status Hysterectomy   Smoking Status Every Day   BSA 1.8 m²          ECG dated 6/03/2024 independently reviewed   NSR 81        Constitutional:       Appearance: Healthy appearance. Not in distress.   Eyes:      Conjunctiva/sclera: Conjunctivae normal.   Neck:      Vascular: JVD normal.   Pulmonary:      Effort: Pulmonary effort is normal.      Breath sounds: Normal breath sounds.   Cardiovascular:      PMI " at left midclavicular line. Normal rate. Regular rhythm. Normal S1. Normal S2.       Murmurs: There is no murmur.      No rub.   Pulses:     Intact distal pulses.   Edema:     Peripheral edema absent.   Abdominal:      General: Bowel sounds are normal.   Musculoskeletal:      Cervical back: Neck supple. Skin:     General: Skin is warm and dry.   Neurological:      Mental Status: Alert and oriented to person, place and time.           Problem List Items Addressed This Visit             ICD-10-CM    DM2 (diabetes mellitus, type 2) (Multi) E11.9    Relevant Medications    atorvastatin (Lipitor) 80 mg tablet    HTN (hypertension) - Primary I10    Relevant Orders    ECG 12 lead (Ancillary Performed)    Hyperlipidemia E78.5    Relevant Medications    atorvastatin (Lipitor) 80 mg tablet     Other Visit Diagnoses         Codes    Abnormal EKG     R94.31    Relevant Orders    Transthoracic echo (TTE) complete    Nuclear Stress Test    Shortness of breath     R06.02    Relevant Orders    Transthoracic echo (TTE) complete    Nuclear Stress Test    Fatigue, unspecified type     R53.83    Relevant Orders    Transthoracic echo (TTE) complete    Nuclear Stress Test    Chest pressure     R07.89    Relevant Orders    Transthoracic echo (TTE) complete    Nuclear Stress Test            Echocardiogram r/o structural disease   Pharm / MPI stress testing with atypical chest pain and risk factors     Most recent LDL 77 mg/dL with T2DM,  ASCVD on CT chest   --Change Simvastatin to Atorvastatin      We discussed the importance of complete smoking cessation   F/U 4-6 weeks to review test results         06/03/24 at 10:02 AM - JUAN DAVID Love-CNP        Followup Appts:  Future Appointments   Date Time Provider Department Center   6/26/2024  2:00 PM [facility]   7/8/2024 11:45 AM Salvatore Arnold MD [address]   11/27/2024  9:00 AM Mery Wall PA-C [id number]

## 2024-06-26 ENCOUNTER — PRE-ADMISSION TESTING (OUTPATIENT)
Dept: PREADMISSION TESTING | Facility: HOSPITAL | Age: 54
End: 2024-06-26
Payer: COMMERCIAL

## 2024-06-26 ASSESSMENT — DUKE ACTIVITY SCORE INDEX (DASI)
CAN YOU DO LIGHT WORK AROUND THE HOUSE LIKE DUSTING OR WASHING DISHES: NO
CAN YOU PARTICIPATE IN STRENOUS SPORTS LIKE SWIMMING, SINGLES TENNIS, FOOTBALL, BASKETBALL, OR SKIING: NO
CAN YOU PARTICIPATE IN MODERATE RECREATIONAL ACTIVITIES LIKE GOLF, BOWLING, DANCING, DOUBLES TENNIS OR THROWING A BASEBALL OR FOOTBALL: NO
DASI METS SCORE: 3.9
CAN YOU HAVE SEXUAL RELATIONS: YES
TOTAL_SCORE: 9.75
CAN YOU WALK INDOORS, SUCH AS AROUND YOUR HOUSE: YES
CAN YOU TAKE CARE OF YOURSELF (EAT, DRESS, BATHE, OR USE TOILET): YES
CAN YOU WALK A BLOCK OR TWO ON LEVEL GROUND: NO
CAN YOU CLIMB A FLIGHT OF STAIRS OR WALK UP A HILL: NO
CAN YOU DO YARD WORK LIKE RAKING LEAVES, WEEDING OR PUSHING A MOWER: NO
CAN YOU DO HEAVY WORK AROUND THE HOUSE LIKE SCRUBBING FLOORS OR LIFTING AND MOVING HEAVY FURNITURE: NO
CAN YOU DO MODERATE WORK AROUND THE HOUSE LIKE VACUUMING, SWEEPING FLOORS OR CARRYING GROCERIES: NO
CAN YOU RUN A SHORT DISTANCE: NO

## 2024-06-26 NOTE — PREPROCEDURE INSTRUCTIONS
Medication List            Accurate as of June 26, 2024  9:55 AM. Always use your most recent med list.                atorvastatin 80 mg tablet  Commonly known as: Lipitor  Take 1 tablet (80 mg) by mouth once daily.     B complex-vitamin C-folic acid 1- mg-mg-mcg tablet  Commonly known as: Nephro-Robinson Rx     cholecalciferol 25 MCG (1000 UT) capsule  Commonly known as: Vitamin D-3     cyanocobalamin (vitamin B-12) 1,000 mcg tablet extended release  Commonly known as: Vitamin B-12     DULoxetine 30 mg DR capsule  Commonly known as: Cymbalta  Take 1 capsule (30 mg) by mouth once daily. Do not crush or chew.     famotidine 40 mg tablet  Commonly known as: Pepcid  Take 1 tablet (40 mg) by mouth once daily at bedtime.     Jardiance 25 mg  Generic drug: empagliflozin     lisinopril 5 mg tablet  Take 1 tablet (5 mg) by mouth once daily.     meloxicam 15 mg tablet  Commonly known as: Mobic  Take 1 tablet (15 mg) by mouth once daily as needed for moderate pain (4 - 6).     metFORMIN  mg 24 hr tablet  Commonly known as: Glucophage-XR     OneTouch Verio Flex meter misc  Generic drug: blood-glucose meter     OneTouch Verio test strips strip  Generic drug: blood sugar diagnostic     Rybelsus 3 mg tablet  Generic drug: semaglutide     Sutab 1.479-0.188- 0.225 gram tablet  Generic drug: sod sulf-pot chloride-mag sulf  Take 12 tabs the day before and 12 tabs the day of colonoscopy per included instructions. Take 1 tab every 1-2 mins. Finish tabs & 16 oz of water within 20 mins. Finish prep per instructions.                      - Call Outpatient Surgery the day before procedure/surgery (Friday for a Monday procedure) between 1-3 pm to get your arrival time:  [phone]    -Follow prep instructions carefully, stools should be clear yellow.     -You can have clear liquids ( Water, Tea, Pop/Soda, Gatorade/Powerade, Black coffee) up to 3 hours before your procedure. NO JUICES, NO DAIRY, NO CREAMERS, NO HALF/HALF, NO  NON-DAIRY CREAMERS/POWDERS.    -Please refrain from smoking THC, cigarettes, and/or vaping prior to your procedure for at least 24 hours.     - On day of procedure, have a  (if having anesthesia or sedation). Park in the back parking lot and come through the ER lobby. Take elevator to second floor outpatient dept. Check in at the outpatient surgery window.    - Wear comfortable clothes, remove all jewelry and piercings.    -Glasses, contacts, and/or dentures may have to be removed. Bring a glass/contact case. A denture cup will be provided.    -Please shower or bathe in the morning using an antibacterial soap. No makeup. Follow instructions if you are issued Hibiclens soap and/or mouth wash.     -No more than 2 visitors at your bedside.

## 2024-06-27 ENCOUNTER — HOSPITAL ENCOUNTER (OUTPATIENT)
Dept: RADIOLOGY | Facility: HOSPITAL | Age: 54
Discharge: HOME | End: 2024-06-27
Payer: COMMERCIAL

## 2024-06-27 ENCOUNTER — HOSPITAL ENCOUNTER (OUTPATIENT)
Dept: CARDIOLOGY | Facility: HOSPITAL | Age: 54
Discharge: HOME | End: 2024-06-27
Payer: COMMERCIAL

## 2024-06-27 DIAGNOSIS — R06.02 SHORTNESS OF BREATH: ICD-10-CM

## 2024-06-27 DIAGNOSIS — R94.31 ABNORMAL EKG: ICD-10-CM

## 2024-06-27 DIAGNOSIS — R53.83 FATIGUE, UNSPECIFIED TYPE: ICD-10-CM

## 2024-06-27 DIAGNOSIS — R07.89 CHEST PRESSURE: ICD-10-CM

## 2024-06-27 LAB
AORTIC VALVE MEAN GRADIENT: 4 MMHG
AORTIC VALVE PEAK VELOCITY: 1.37 M/S
AV PEAK GRADIENT: 7.5 MMHG
AVA (PEAK VEL): 2.4 CM2
AVA (VTI): 2.66 CM2
EJECTION FRACTION APICAL 4 CHAMBER: 59.8
EJECTION FRACTION: 63 %
LEFT ATRIUM VOLUME AREA LENGTH INDEX BSA: 13.1 ML/M2
LEFT VENTRICLE INTERNAL DIMENSION DIASTOLE: 4.09 CM (ref 3.5–6)
LEFT VENTRICULAR OUTFLOW TRACT DIAMETER: 2.01 CM
MITRAL VALVE E/A RATIO: 0.82
RIGHT VENTRICLE FREE WALL PEAK S': 11 CM/S
RIGHT VENTRICLE PEAK SYSTOLIC PRESSURE: 27.6 MMHG
TRICUSPID ANNULAR PLANE SYSTOLIC EXCURSION: 1.9 CM

## 2024-06-27 PROCEDURE — 78452 HT MUSCLE IMAGE SPECT MULT: CPT | Performed by: RADIOLOGY

## 2024-06-27 PROCEDURE — 93306 TTE W/DOPPLER COMPLETE: CPT

## 2024-06-27 PROCEDURE — 3430000001 HC RX 343 DIAGNOSTIC RADIOPHARMACEUTICALS: Performed by: NURSE PRACTITIONER

## 2024-06-27 PROCEDURE — 93306 TTE W/DOPPLER COMPLETE: CPT | Performed by: INTERNAL MEDICINE

## 2024-06-27 PROCEDURE — 93017 CV STRESS TEST TRACING ONLY: CPT

## 2024-06-27 PROCEDURE — 2500000004 HC RX 250 GENERAL PHARMACY W/ HCPCS (ALT 636 FOR OP/ED): Performed by: STUDENT IN AN ORGANIZED HEALTH CARE EDUCATION/TRAINING PROGRAM

## 2024-06-27 PROCEDURE — 3430000001 HC RX 343 DIAGNOSTIC RADIOPHARMACEUTICALS

## 2024-06-27 PROCEDURE — A9502 TC99M TETROFOSMIN: HCPCS | Performed by: NURSE PRACTITIONER

## 2024-06-27 PROCEDURE — A9502 TC99M TETROFOSMIN: HCPCS

## 2024-06-27 PROCEDURE — 78452 HT MUSCLE IMAGE SPECT MULT: CPT

## 2024-06-27 RX ORDER — AMINOPHYLLINE 25 MG/ML
100 INJECTION, SOLUTION INTRAVENOUS ONCE
Status: COMPLETED | OUTPATIENT
Start: 2024-06-27 | End: 2024-06-27

## 2024-06-27 RX ORDER — REGADENOSON 0.08 MG/ML
0.4 INJECTION, SOLUTION INTRAVENOUS ONCE
Status: COMPLETED | OUTPATIENT
Start: 2024-06-27 | End: 2024-06-27

## 2024-06-28 ENCOUNTER — TELEPHONE (OUTPATIENT)
Dept: CARDIOLOGY | Facility: CLINIC | Age: 54
End: 2024-06-28
Payer: COMMERCIAL

## 2024-06-28 ENCOUNTER — TELEPHONE VISIT (OUTPATIENT)
Dept: CARDIOLOGY | Facility: CLINIC | Age: 54
End: 2024-06-28
Payer: COMMERCIAL

## 2024-06-28 DIAGNOSIS — I10 PRIMARY HYPERTENSION: ICD-10-CM

## 2024-06-28 DIAGNOSIS — E11.69 TYPE 2 DIABETES MELLITUS WITH OTHER SPECIFIED COMPLICATION, WITHOUT LONG-TERM CURRENT USE OF INSULIN (MULTI): ICD-10-CM

## 2024-06-28 DIAGNOSIS — E11.9 TYPE 2 DIABETES MELLITUS WITHOUT COMPLICATION, WITHOUT LONG-TERM CURRENT USE OF INSULIN (MULTI): ICD-10-CM

## 2024-06-28 DIAGNOSIS — E78.2 MIXED HYPERLIPIDEMIA: ICD-10-CM

## 2024-06-28 DIAGNOSIS — R07.9 CHEST PAIN, UNSPECIFIED TYPE: Primary | ICD-10-CM

## 2024-06-28 DIAGNOSIS — R07.89 ATYPICAL CHEST PAIN: ICD-10-CM

## 2024-06-28 DIAGNOSIS — R94.39 POSITIVE CARDIAC STRESS TEST: ICD-10-CM

## 2024-06-28 DIAGNOSIS — F17.200 SMOKING: ICD-10-CM

## 2024-06-28 DIAGNOSIS — I10 ESSENTIAL HYPERTENSION: ICD-10-CM

## 2024-06-28 DIAGNOSIS — I25.10 ASCVD (ARTERIOSCLEROTIC CARDIOVASCULAR DISEASE): ICD-10-CM

## 2024-06-28 PROCEDURE — 99443 PR PHYS/QHP TELEPHONE EVALUATION 21-30 MIN: CPT | Performed by: NURSE PRACTITIONER

## 2024-06-28 RX ORDER — METOPROLOL TARTRATE 25 MG/1
25 TABLET, FILM COATED ORAL 2 TIMES DAILY
Qty: 180 TABLET | Refills: 3 | Status: SHIPPED | OUTPATIENT
Start: 2024-06-28 | End: 2025-06-28

## 2024-06-28 RX ORDER — LISINOPRIL 2.5 MG/1
2.5 TABLET ORAL DAILY
Qty: 90 TABLET | Refills: 3 | Status: SHIPPED | OUTPATIENT
Start: 2024-06-28 | End: 2025-06-28

## 2024-06-28 RX ORDER — NAPROXEN SODIUM 220 MG/1
81 TABLET, FILM COATED ORAL DAILY
Qty: 30 TABLET | Refills: 11 | Status: SHIPPED | OUTPATIENT
Start: 2024-06-28 | End: 2025-06-28

## 2024-06-28 NOTE — PROGRESS NOTES
Carmelita Mcdonald is a 53 y.o. year old female patient with HTN, HLD, T2DM not requiring insulin, active smoker and ASCVD on CT chest calcium scoring *260        Stress testing returned positive, suggestive of moderate ischemia as described below.  She has intermittent atypical chest discomfort      NM stress testing 6/28/2024  IMPRESSION:  1. Suspicion of moderate ischemia along the small territory of the  mid anterior wall. Further evaluation and/or treatment would be  warranted.  2. No evidence of prior myocardial infarction.  3. The left ventricle is normal in size.  4. Normal LV wall motion with a post-stress LV EF estimated at  greater than 65%        Echocardiogram 6/28/2024  CONCLUSIONS:   1. The left ventricular systolic function is normal, with a visually estimated ejection fraction of 60-65%.   2. Spectral Doppler shows an impaired relaxation pattern of left ventricular diastolic filling.   3. There is normal right ventricular global systolic function.   4. There is trace-mild mitral, tricuspid and pulmonic regurgitation.        Dx: atypical chest pain, ASCVD,  abnormal EKG,  preserved LVEF, positive nuclear stress test      PLAN:   Amlodipine 2.5 mg daily   Lisinopril 2.5 mg daily   ASA 81 mg daily   Atorvastatin 80 mg HS   Will send to Dr. Aguirre -- at her request,  for R/L  cardiac catheterization   CBC, BMP prior to procedure   We discussed the procedure, risks, benefits and possible complications at length and she agrees to proceed  Follow up [facility] afterwards           Telephone visit 25 minutes, duplicate documentation

## 2024-06-28 NOTE — TELEPHONE ENCOUNTER
Carmelita Mcdonald is a 53 y.o. year old female patient with HTN, HLD, T2DM not requiring insulin, active smoker and ASCVD on CT chest calcium scoring *260      Stress testing returned positive, suggestive of moderate ischemia as described below.  She has intermittent atypical chest discomfort     NM stress testing 6/28/2024  IMPRESSION:  1. Suspicion of moderate ischemia along the small territory of the  mid anterior wall. Further evaluation and/or treatment would be  warranted.  2. No evidence of prior myocardial infarction.  3. The left ventricle is normal in size.  4. Normal LV wall motion with a post-stress LV EF estimated at  greater than 65%      Echocardiogram 6/28/2024  CONCLUSIONS:   1. The left ventricular systolic function is normal, with a visually estimated ejection fraction of 60-65%.   2. Spectral Doppler shows an impaired relaxation pattern of left ventricular diastolic filling.   3. There is normal right ventricular global systolic function.   4. There is trace-mild mitral, tricuspid and pulmonic regurgitation.      Dx: atypical chest pain, ASCVD,  abnormal EKG,  preserved LVEF, positive nuclear stress test     PLAN:   Amlodipine 2.5 mg daily   Lisinopril 2.5 mg daily   ASA 81 mg daily   Atorvastatin 80 mg HS   Will send to Dr. Aguirre -- at her request,  for R/L  cardiac catheterization   CBC, BMP prior to procedure   We discussed the procedure, risks, benefits and possible complications at length and she agrees to proceed  Follow up [facility] afterwards

## 2024-07-01 ENCOUNTER — APPOINTMENT (OUTPATIENT)
Dept: RADIOLOGY | Facility: HOSPITAL | Age: 54
End: 2024-07-01
Payer: COMMERCIAL

## 2024-07-01 ENCOUNTER — APPOINTMENT (OUTPATIENT)
Dept: CARDIOLOGY | Facility: HOSPITAL | Age: 54
End: 2024-07-01
Payer: COMMERCIAL

## 2024-07-01 ENCOUNTER — HOSPITAL ENCOUNTER (OUTPATIENT)
Facility: HOSPITAL | Age: 54
Setting detail: OBSERVATION
Discharge: HOME | End: 2024-07-01
Attending: STUDENT IN AN ORGANIZED HEALTH CARE EDUCATION/TRAINING PROGRAM | Admitting: INTERNAL MEDICINE
Payer: COMMERCIAL

## 2024-07-01 ENCOUNTER — TELEPHONE (OUTPATIENT)
Dept: CARDIOLOGY | Facility: CLINIC | Age: 54
End: 2024-07-01

## 2024-07-01 VITALS
HEART RATE: 62 BPM | BODY MASS INDEX: 28.93 KG/M2 | HEIGHT: 63 IN | TEMPERATURE: 97.5 F | WEIGHT: 163.3 LBS | DIASTOLIC BLOOD PRESSURE: 66 MMHG | SYSTOLIC BLOOD PRESSURE: 109 MMHG | RESPIRATION RATE: 18 BRPM | OXYGEN SATURATION: 97 %

## 2024-07-01 DIAGNOSIS — R07.9 CHEST PAIN, UNSPECIFIED TYPE: Primary | ICD-10-CM

## 2024-07-01 DIAGNOSIS — R94.30 ABNORMAL RESULT OF CARDIOVASCULAR FUNCTION STUDY, UNSPECIFIED: ICD-10-CM

## 2024-07-01 DIAGNOSIS — E11.9 TYPE 2 DIABETES MELLITUS WITHOUT COMPLICATION, WITHOUT LONG-TERM CURRENT USE OF INSULIN (MULTI): ICD-10-CM

## 2024-07-01 DIAGNOSIS — I15.9 SECONDARY HYPERTENSION: ICD-10-CM

## 2024-07-01 DIAGNOSIS — K21.9 GASTROESOPHAGEAL REFLUX DISEASE, UNSPECIFIED WHETHER ESOPHAGITIS PRESENT: ICD-10-CM

## 2024-07-01 DIAGNOSIS — R79.0 LOW MAGNESIUM LEVEL: ICD-10-CM

## 2024-07-01 PROBLEM — I20.0 UNSTABLE ANGINA (MULTI): Status: ACTIVE | Noted: 2024-07-01

## 2024-07-01 LAB
ANION GAP SERPL CALC-SCNC: 14 MMOL/L (ref 10–20)
ATRIAL RATE: 67 BPM
ATRIAL RATE: 73 BPM
BASOPHILS # BLD AUTO: 0.05 X10*3/UL (ref 0–0.1)
BASOPHILS NFR BLD AUTO: 0.5 %
BNP SERPL-MCNC: 31 PG/ML (ref 0–99)
BUN SERPL-MCNC: 18 MG/DL (ref 6–23)
CALCIUM SERPL-MCNC: 9.5 MG/DL (ref 8.6–10.3)
CARDIAC TROPONIN I PNL SERPL HS: <3 NG/L (ref 0–13)
CHLORIDE SERPL-SCNC: 103 MMOL/L (ref 98–107)
CHOLEST SERPL-MCNC: 87 MG/DL (ref 0–199)
CHOLESTEROL/HDL RATIO: 2.4
CO2 SERPL-SCNC: 25 MMOL/L (ref 21–32)
CREAT SERPL-MCNC: 0.73 MG/DL (ref 0.5–1.05)
D DIMER PPP FEU-MCNC: 423 NG/ML FEU
EGFRCR SERPLBLD CKD-EPI 2021: >90 ML/MIN/1.73M*2
EOSINOPHIL # BLD AUTO: 0.34 X10*3/UL (ref 0–0.7)
EOSINOPHIL NFR BLD AUTO: 3.3 %
ERYTHROCYTE [DISTWIDTH] IN BLOOD BY AUTOMATED COUNT: 13.1 % (ref 11.5–14.5)
FLUAV RNA RESP QL NAA+PROBE: NOT DETECTED
FLUBV RNA RESP QL NAA+PROBE: NOT DETECTED
GLUCOSE BLD MANUAL STRIP-MCNC: 127 MG/DL (ref 74–99)
GLUCOSE BLD MANUAL STRIP-MCNC: 137 MG/DL (ref 74–99)
GLUCOSE SERPL-MCNC: 125 MG/DL (ref 74–99)
HCT VFR BLD AUTO: 48 % (ref 36–46)
HDLC SERPL-MCNC: 36.5 MG/DL
HGB BLD-MCNC: 16.2 G/DL (ref 12–16)
IMM GRANULOCYTES # BLD AUTO: 0.03 X10*3/UL (ref 0–0.7)
IMM GRANULOCYTES NFR BLD AUTO: 0.3 % (ref 0–0.9)
INR PPP: 1 (ref 0.9–1.1)
LDLC SERPL CALC-MCNC: 34 MG/DL
LYMPHOCYTES # BLD AUTO: 3.75 X10*3/UL (ref 1.2–4.8)
LYMPHOCYTES NFR BLD AUTO: 36.6 %
MAGNESIUM SERPL-MCNC: 1.48 MG/DL (ref 1.6–2.4)
MAGNESIUM SERPL-MCNC: 2 MG/DL (ref 1.6–2.4)
MCH RBC QN AUTO: 29.9 PG (ref 26–34)
MCHC RBC AUTO-ENTMCNC: 33.8 G/DL (ref 32–36)
MCV RBC AUTO: 89 FL (ref 80–100)
MONOCYTES # BLD AUTO: 0.71 X10*3/UL (ref 0.1–1)
MONOCYTES NFR BLD AUTO: 6.9 %
NEUTROPHILS # BLD AUTO: 5.36 X10*3/UL (ref 1.2–7.7)
NEUTROPHILS NFR BLD AUTO: 52.4 %
NON HDL CHOLESTEROL: 51 MG/DL (ref 0–149)
NRBC BLD-RTO: 0 /100 WBCS (ref 0–0)
P AXIS: 59 DEGREES
P AXIS: 60 DEGREES
P OFFSET: 179 MS
P OFFSET: 180 MS
P ONSET: 124 MS
P ONSET: 131 MS
PLATELET # BLD AUTO: 222 X10*3/UL (ref 150–450)
POTASSIUM SERPL-SCNC: 3.8 MMOL/L (ref 3.5–5.3)
PR INTERVAL: 178 MS
PR INTERVAL: 194 MS
PROTHROMBIN TIME: 10.7 SECONDS (ref 9.8–12.8)
Q ONSET: 220 MS
Q ONSET: 221 MS
QRS COUNT: 11 BEATS
QRS COUNT: 12 BEATS
QRS DURATION: 70 MS
QRS DURATION: 74 MS
QT INTERVAL: 378 MS
QT INTERVAL: 404 MS
QTC CALCULATION(BAZETT): 416 MS
QTC CALCULATION(BAZETT): 426 MS
QTC FREDERICIA: 403 MS
QTC FREDERICIA: 419 MS
R AXIS: 75 DEGREES
R AXIS: 79 DEGREES
RBC # BLD AUTO: 5.41 X10*6/UL (ref 4–5.2)
SARS-COV-2 RNA RESP QL NAA+PROBE: NOT DETECTED
SODIUM SERPL-SCNC: 138 MMOL/L (ref 136–145)
T AXIS: 54 DEGREES
T AXIS: 58 DEGREES
T OFFSET: 409 MS
T OFFSET: 423 MS
TRIGL SERPL-MCNC: 85 MG/DL (ref 0–149)
TSH SERPL-ACNC: 2.27 MIU/L (ref 0.44–3.98)
UFH PPP CHRO-ACNC: 0.4 IU/ML
VENTRICULAR RATE: 67 BPM
VENTRICULAR RATE: 73 BPM
VLDL: 17 MG/DL (ref 0–40)
WBC # BLD AUTO: 10.2 X10*3/UL (ref 4.4–11.3)

## 2024-07-01 PROCEDURE — 83735 ASSAY OF MAGNESIUM: CPT | Performed by: STUDENT IN AN ORGANIZED HEALTH CARE EDUCATION/TRAINING PROGRAM

## 2024-07-01 PROCEDURE — 2500000005 HC RX 250 GENERAL PHARMACY W/O HCPCS: Performed by: NURSE PRACTITIONER

## 2024-07-01 PROCEDURE — 84484 ASSAY OF TROPONIN QUANT: CPT | Performed by: STUDENT IN AN ORGANIZED HEALTH CARE EDUCATION/TRAINING PROGRAM

## 2024-07-01 PROCEDURE — 96361 HYDRATE IV INFUSION ADD-ON: CPT | Mod: 59

## 2024-07-01 PROCEDURE — G0378 HOSPITAL OBSERVATION PER HR: HCPCS

## 2024-07-01 PROCEDURE — 99152 MOD SED SAME PHYS/QHP 5/>YRS: CPT | Performed by: INTERNAL MEDICINE

## 2024-07-01 PROCEDURE — 99285 EMERGENCY DEPT VISIT HI MDM: CPT | Mod: 25

## 2024-07-01 PROCEDURE — 99223 1ST HOSP IP/OBS HIGH 75: CPT | Performed by: NURSE PRACTITIONER

## 2024-07-01 PROCEDURE — 93458 L HRT ARTERY/VENTRICLE ANGIO: CPT | Performed by: INTERNAL MEDICINE

## 2024-07-01 PROCEDURE — 83735 ASSAY OF MAGNESIUM: CPT | Performed by: NURSE PRACTITIONER

## 2024-07-01 PROCEDURE — 2500000001 HC RX 250 WO HCPCS SELF ADMINISTERED DRUGS (ALT 637 FOR MEDICARE OP): Performed by: PHYSICIAN ASSISTANT

## 2024-07-01 PROCEDURE — 85379 FIBRIN DEGRADATION QUANT: CPT | Performed by: STUDENT IN AN ORGANIZED HEALTH CARE EDUCATION/TRAINING PROGRAM

## 2024-07-01 PROCEDURE — 85025 COMPLETE CBC W/AUTO DIFF WBC: CPT | Performed by: STUDENT IN AN ORGANIZED HEALTH CARE EDUCATION/TRAINING PROGRAM

## 2024-07-01 PROCEDURE — 2500000004 HC RX 250 GENERAL PHARMACY W/ HCPCS (ALT 636 FOR OP/ED): Performed by: STUDENT IN AN ORGANIZED HEALTH CARE EDUCATION/TRAINING PROGRAM

## 2024-07-01 PROCEDURE — 99153 MOD SED SAME PHYS/QHP EA: CPT | Performed by: INTERNAL MEDICINE

## 2024-07-01 PROCEDURE — S4991 NICOTINE PATCH NONLEGEND: HCPCS | Performed by: NURSE PRACTITIONER

## 2024-07-01 PROCEDURE — C1894 INTRO/SHEATH, NON-LASER: HCPCS | Performed by: INTERNAL MEDICINE

## 2024-07-01 PROCEDURE — 85610 PROTHROMBIN TIME: CPT | Performed by: NURSE PRACTITIONER

## 2024-07-01 PROCEDURE — 82947 ASSAY GLUCOSE BLOOD QUANT: CPT

## 2024-07-01 PROCEDURE — 84484 ASSAY OF TROPONIN QUANT: CPT | Mod: 91 | Performed by: NURSE PRACTITIONER

## 2024-07-01 PROCEDURE — 7100000009 HC PHASE TWO TIME - INITIAL BASE CHARGE: Performed by: INTERNAL MEDICINE

## 2024-07-01 PROCEDURE — 36415 COLL VENOUS BLD VENIPUNCTURE: CPT | Performed by: STUDENT IN AN ORGANIZED HEALTH CARE EDUCATION/TRAINING PROGRAM

## 2024-07-01 PROCEDURE — 2720000007 HC OR 272 NO HCPCS: Performed by: INTERNAL MEDICINE

## 2024-07-01 PROCEDURE — 2500000005 HC RX 250 GENERAL PHARMACY W/O HCPCS: Performed by: INTERNAL MEDICINE

## 2024-07-01 PROCEDURE — 2550000001 HC RX 255 CONTRASTS: Performed by: INTERNAL MEDICINE

## 2024-07-01 PROCEDURE — 84443 ASSAY THYROID STIM HORMONE: CPT | Performed by: STUDENT IN AN ORGANIZED HEALTH CARE EDUCATION/TRAINING PROGRAM

## 2024-07-01 PROCEDURE — 71045 X-RAY EXAM CHEST 1 VIEW: CPT

## 2024-07-01 PROCEDURE — 2500000001 HC RX 250 WO HCPCS SELF ADMINISTERED DRUGS (ALT 637 FOR MEDICARE OP): Performed by: STUDENT IN AN ORGANIZED HEALTH CARE EDUCATION/TRAINING PROGRAM

## 2024-07-01 PROCEDURE — 96366 THER/PROPH/DIAG IV INF ADDON: CPT | Mod: 59

## 2024-07-01 PROCEDURE — 80048 BASIC METABOLIC PNL TOTAL CA: CPT | Performed by: STUDENT IN AN ORGANIZED HEALTH CARE EDUCATION/TRAINING PROGRAM

## 2024-07-01 PROCEDURE — 96375 TX/PRO/DX INJ NEW DRUG ADDON: CPT | Mod: 59

## 2024-07-01 PROCEDURE — 2500000004 HC RX 250 GENERAL PHARMACY W/ HCPCS (ALT 636 FOR OP/ED): Performed by: INTERNAL MEDICINE

## 2024-07-01 PROCEDURE — 71045 X-RAY EXAM CHEST 1 VIEW: CPT | Mod: FOREIGN READ | Performed by: RADIOLOGY

## 2024-07-01 PROCEDURE — 2500000001 HC RX 250 WO HCPCS SELF ADMINISTERED DRUGS (ALT 637 FOR MEDICARE OP): Performed by: NURSE PRACTITIONER

## 2024-07-01 PROCEDURE — 96365 THER/PROPH/DIAG IV INF INIT: CPT | Mod: 59

## 2024-07-01 PROCEDURE — C1760 CLOSURE DEV, VASC: HCPCS | Performed by: INTERNAL MEDICINE

## 2024-07-01 PROCEDURE — 2500000004 HC RX 250 GENERAL PHARMACY W/ HCPCS (ALT 636 FOR OP/ED): Performed by: NURSE PRACTITIONER

## 2024-07-01 PROCEDURE — C1887 CATHETER, GUIDING: HCPCS | Performed by: INTERNAL MEDICINE

## 2024-07-01 PROCEDURE — 87636 SARSCOV2 & INF A&B AMP PRB: CPT | Performed by: STUDENT IN AN ORGANIZED HEALTH CARE EDUCATION/TRAINING PROGRAM

## 2024-07-01 PROCEDURE — 93005 ELECTROCARDIOGRAM TRACING: CPT

## 2024-07-01 PROCEDURE — 2500000002 HC RX 250 W HCPCS SELF ADMINISTERED DRUGS (ALT 637 FOR MEDICARE OP, ALT 636 FOR OP/ED): Performed by: NURSE PRACTITIONER

## 2024-07-01 PROCEDURE — 93005 ELECTROCARDIOGRAM TRACING: CPT | Mod: 59

## 2024-07-01 PROCEDURE — 7100000010 HC PHASE TWO TIME - EACH INCREMENTAL 1 MINUTE: Performed by: INTERNAL MEDICINE

## 2024-07-01 PROCEDURE — 96367 TX/PROPH/DG ADDL SEQ IV INF: CPT | Mod: 59

## 2024-07-01 PROCEDURE — 83880 ASSAY OF NATRIURETIC PEPTIDE: CPT | Performed by: STUDENT IN AN ORGANIZED HEALTH CARE EDUCATION/TRAINING PROGRAM

## 2024-07-01 PROCEDURE — 99245 OFF/OP CONSLTJ NEW/EST HI 55: CPT | Performed by: STUDENT IN AN ORGANIZED HEALTH CARE EDUCATION/TRAINING PROGRAM

## 2024-07-01 PROCEDURE — 96373 THER/PROPH/DIAG INJ IA: CPT | Performed by: INTERNAL MEDICINE

## 2024-07-01 PROCEDURE — 85520 HEPARIN ASSAY: CPT | Performed by: INTERNAL MEDICINE

## 2024-07-01 PROCEDURE — 80061 LIPID PANEL: CPT | Performed by: NURSE PRACTITIONER

## 2024-07-01 RX ORDER — ONDANSETRON HYDROCHLORIDE 2 MG/ML
INJECTION, SOLUTION INTRAVENOUS AS NEEDED
Status: DISCONTINUED | OUTPATIENT
Start: 2024-07-01 | End: 2024-07-01 | Stop reason: HOSPADM

## 2024-07-01 RX ORDER — MAGNESIUM SULFATE HEPTAHYDRATE 40 MG/ML
2 INJECTION, SOLUTION INTRAVENOUS ONCE
Status: COMPLETED | OUTPATIENT
Start: 2024-07-01 | End: 2024-07-01

## 2024-07-01 RX ORDER — MIDAZOLAM HYDROCHLORIDE 1 MG/ML
INJECTION, SOLUTION INTRAMUSCULAR; INTRAVENOUS AS NEEDED
Status: DISCONTINUED | OUTPATIENT
Start: 2024-07-01 | End: 2024-07-01 | Stop reason: HOSPADM

## 2024-07-01 RX ORDER — IBUPROFEN 200 MG
1 TABLET ORAL DAILY
Status: DISCONTINUED | OUTPATIENT
Start: 2024-08-12 | End: 2024-07-01 | Stop reason: HOSPADM

## 2024-07-01 RX ORDER — LIDOCAINE HYDROCHLORIDE 20 MG/ML
INJECTION, SOLUTION INFILTRATION; PERINEURAL AS NEEDED
Status: DISCONTINUED | OUTPATIENT
Start: 2024-07-01 | End: 2024-07-01 | Stop reason: HOSPADM

## 2024-07-01 RX ORDER — HEPARIN SODIUM 10000 [USP'U]/100ML
0-4000 INJECTION, SOLUTION INTRAVENOUS CONTINUOUS
Status: DISCONTINUED | OUTPATIENT
Start: 2024-07-01 | End: 2024-07-01

## 2024-07-01 RX ORDER — PANTOPRAZOLE SODIUM 40 MG/1
40 TABLET, DELAYED RELEASE ORAL
Qty: 30 TABLET | Refills: 0 | Status: SHIPPED | OUTPATIENT
Start: 2024-07-01 | End: 2024-07-31

## 2024-07-01 RX ORDER — DEXTROSE MONOHYDRATE AND SODIUM CHLORIDE 5; .45 G/100ML; G/100ML
75 INJECTION, SOLUTION INTRAVENOUS CONTINUOUS
Status: DISCONTINUED | OUTPATIENT
Start: 2024-07-01 | End: 2024-07-01 | Stop reason: HOSPADM

## 2024-07-01 RX ORDER — NAPROXEN SODIUM 220 MG/1
243 TABLET, FILM COATED ORAL ONCE
Status: COMPLETED | OUTPATIENT
Start: 2024-07-01 | End: 2024-07-01

## 2024-07-01 RX ORDER — FENTANYL CITRATE 50 UG/ML
INJECTION, SOLUTION INTRAMUSCULAR; INTRAVENOUS AS NEEDED
Status: DISCONTINUED | OUTPATIENT
Start: 2024-07-01 | End: 2024-07-01 | Stop reason: HOSPADM

## 2024-07-01 RX ORDER — IBUPROFEN 200 MG
1 TABLET ORAL DAILY
Status: DISCONTINUED | OUTPATIENT
Start: 2024-07-01 | End: 2024-07-01 | Stop reason: HOSPADM

## 2024-07-01 RX ORDER — NICOTINE 7MG/24HR
1 PATCH, TRANSDERMAL 24 HOURS TRANSDERMAL DAILY
Status: DISCONTINUED | OUTPATIENT
Start: 2024-08-26 | End: 2024-07-01 | Stop reason: HOSPADM

## 2024-07-01 RX ORDER — NAPROXEN SODIUM 220 MG/1
81 TABLET, FILM COATED ORAL DAILY
Status: DISCONTINUED | OUTPATIENT
Start: 2024-07-02 | End: 2024-07-01 | Stop reason: HOSPADM

## 2024-07-01 RX ORDER — DULOXETIN HYDROCHLORIDE 30 MG/1
30 CAPSULE, DELAYED RELEASE ORAL DAILY
Status: DISCONTINUED | OUTPATIENT
Start: 2024-07-01 | End: 2024-07-01 | Stop reason: HOSPADM

## 2024-07-01 RX ORDER — ATORVASTATIN CALCIUM 80 MG/1
80 TABLET, FILM COATED ORAL NIGHTLY
Status: DISCONTINUED | OUTPATIENT
Start: 2024-07-01 | End: 2024-07-01 | Stop reason: HOSPADM

## 2024-07-01 RX ORDER — DEXTROSE 50 % IN WATER (D50W) INTRAVENOUS SYRINGE
12.5
Status: DISCONTINUED | OUTPATIENT
Start: 2024-07-01 | End: 2024-07-01 | Stop reason: HOSPADM

## 2024-07-01 RX ORDER — VERAPAMIL HYDROCHLORIDE 2.5 MG/ML
INJECTION, SOLUTION INTRAVENOUS AS NEEDED
Status: DISCONTINUED | OUTPATIENT
Start: 2024-07-01 | End: 2024-07-01 | Stop reason: HOSPADM

## 2024-07-01 RX ORDER — HEPARIN SODIUM 1000 [USP'U]/ML
INJECTION, SOLUTION INTRAVENOUS; SUBCUTANEOUS AS NEEDED
Status: DISCONTINUED | OUTPATIENT
Start: 2024-07-01 | End: 2024-07-01 | Stop reason: HOSPADM

## 2024-07-01 RX ORDER — LISINOPRIL 5 MG/1
2.5 TABLET ORAL DAILY
Status: DISCONTINUED | OUTPATIENT
Start: 2024-07-01 | End: 2024-07-01 | Stop reason: HOSPADM

## 2024-07-01 RX ORDER — ONDANSETRON HYDROCHLORIDE 2 MG/ML
4 INJECTION, SOLUTION INTRAVENOUS ONCE
Status: COMPLETED | OUTPATIENT
Start: 2024-07-01 | End: 2024-07-01

## 2024-07-01 RX ORDER — DOCUSATE SODIUM 100 MG/1
100 CAPSULE, LIQUID FILLED ORAL 2 TIMES DAILY
Status: DISCONTINUED | OUTPATIENT
Start: 2024-07-01 | End: 2024-07-01 | Stop reason: HOSPADM

## 2024-07-01 RX ORDER — METOPROLOL TARTRATE 25 MG/1
25 TABLET, FILM COATED ORAL 2 TIMES DAILY
Status: DISCONTINUED | OUTPATIENT
Start: 2024-07-01 | End: 2024-07-01 | Stop reason: HOSPADM

## 2024-07-01 RX ORDER — INSULIN LISPRO 100 [IU]/ML
0-5 INJECTION, SOLUTION INTRAVENOUS; SUBCUTANEOUS EVERY 6 HOURS
Status: DISCONTINUED | OUTPATIENT
Start: 2024-07-01 | End: 2024-07-01 | Stop reason: HOSPADM

## 2024-07-01 RX ORDER — NITROGLYCERIN 40 MG/100ML
INJECTION INTRAVENOUS AS NEEDED
Status: DISCONTINUED | OUTPATIENT
Start: 2024-07-01 | End: 2024-07-01 | Stop reason: HOSPADM

## 2024-07-01 RX ORDER — DEXTROSE 50 % IN WATER (D50W) INTRAVENOUS SYRINGE
25
Status: DISCONTINUED | OUTPATIENT
Start: 2024-07-01 | End: 2024-07-01 | Stop reason: HOSPADM

## 2024-07-01 RX ORDER — PANTOPRAZOLE SODIUM 40 MG/1
40 TABLET, DELAYED RELEASE ORAL
Status: DISCONTINUED | OUTPATIENT
Start: 2024-07-01 | End: 2024-07-01 | Stop reason: HOSPADM

## 2024-07-01 RX ORDER — AMLODIPINE BESYLATE 2.5 MG/1
2.5 TABLET ORAL DAILY
Qty: 30 TABLET | Refills: 0 | Status: SHIPPED | OUTPATIENT
Start: 2024-07-01 | End: 2024-07-31

## 2024-07-01 RX ORDER — METFORMIN HYDROCHLORIDE 500 MG/1
1000 TABLET, EXTENDED RELEASE ORAL 2 TIMES DAILY
Start: 2024-07-04

## 2024-07-01 RX ORDER — NITROGLYCERIN 0.4 MG/1
0.4 TABLET SUBLINGUAL ONCE
Status: COMPLETED | OUTPATIENT
Start: 2024-07-01 | End: 2024-07-01

## 2024-07-01 SDOH — SOCIAL STABILITY: SOCIAL INSECURITY: DOES ANYONE TRY TO KEEP YOU FROM HAVING/CONTACTING OTHER FRIENDS OR DOING THINGS OUTSIDE YOUR HOME?: NO

## 2024-07-01 SDOH — SOCIAL STABILITY: SOCIAL INSECURITY: DO YOU FEEL UNSAFE GOING BACK TO THE PLACE WHERE YOU ARE LIVING?: NO

## 2024-07-01 SDOH — SOCIAL STABILITY: SOCIAL INSECURITY: DO YOU FEEL ANYONE HAS EXPLOITED OR TAKEN ADVANTAGE OF YOU FINANCIALLY OR OF YOUR PERSONAL PROPERTY?: NO

## 2024-07-01 SDOH — SOCIAL STABILITY: SOCIAL INSECURITY: ARE YOU OR HAVE YOU BEEN THREATENED OR ABUSED PHYSICALLY, EMOTIONALLY, OR SEXUALLY BY ANYONE?: NO

## 2024-07-01 SDOH — SOCIAL STABILITY: SOCIAL INSECURITY: HAS ANYONE EVER THREATENED TO HURT YOUR FAMILY OR YOUR PETS?: NO

## 2024-07-01 SDOH — SOCIAL STABILITY: SOCIAL INSECURITY: HAVE YOU HAD THOUGHTS OF HARMING ANYONE ELSE?: NO

## 2024-07-01 SDOH — SOCIAL STABILITY: SOCIAL INSECURITY: ARE THERE ANY APPARENT SIGNS OF INJURIES/BEHAVIORS THAT COULD BE RELATED TO ABUSE/NEGLECT?: NO

## 2024-07-01 SDOH — SOCIAL STABILITY: SOCIAL INSECURITY: WERE YOU ABLE TO COMPLETE ALL THE BEHAVIORAL HEALTH SCREENINGS?: YES

## 2024-07-01 SDOH — SOCIAL STABILITY: SOCIAL INSECURITY: ABUSE: ADULT

## 2024-07-01 SDOH — SOCIAL STABILITY: SOCIAL INSECURITY: HAVE YOU HAD ANY THOUGHTS OF HARMING ANYONE ELSE?: NO

## 2024-07-01 ASSESSMENT — PAIN - FUNCTIONAL ASSESSMENT
PAIN_FUNCTIONAL_ASSESSMENT: 0-10

## 2024-07-01 ASSESSMENT — ACTIVITIES OF DAILY LIVING (ADL)
ADEQUATE_TO_COMPLETE_ADL: YES
DRESSING YOURSELF: INDEPENDENT
LACK_OF_TRANSPORTATION: NO
TOILETING: INDEPENDENT
HEARING - RIGHT EAR: FUNCTIONAL
WALKS IN HOME: INDEPENDENT
PATIENT'S MEMORY ADEQUATE TO SAFELY COMPLETE DAILY ACTIVITIES?: YES
FEEDING YOURSELF: INDEPENDENT
GROOMING: INDEPENDENT
JUDGMENT_ADEQUATE_SAFELY_COMPLETE_DAILY_ACTIVITIES: YES
BATHING: INDEPENDENT
HEARING - LEFT EAR: FUNCTIONAL
LACK_OF_TRANSPORTATION: NO

## 2024-07-01 ASSESSMENT — ENCOUNTER SYMPTOMS
POLYDIPSIA: 0
SHORTNESS OF BREATH: 0
HEADACHES: 0
DIARRHEA: 0
SORE THROAT: 0
VOMITING: 0
EYE PAIN: 0
ABDOMINAL DISTENTION: 0
DYSURIA: 0
CONSTIPATION: 0
FEVER: 0
NAUSEA: 0
FATIGUE: 1
EYE REDNESS: 0
CONFUSION: 0
SINUS PRESSURE: 0
SINUS PAIN: 0
COLOR CHANGE: 0
BRUISES/BLEEDS EASILY: 0
NECK PAIN: 0
COUGH: 0
ABDOMINAL PAIN: 0
BACK PAIN: 1
PALPITATIONS: 0
AGITATION: 0
CHILLS: 0

## 2024-07-01 ASSESSMENT — LIFESTYLE VARIABLES
AUDIT-C TOTAL SCORE: 0
HOW MANY STANDARD DRINKS CONTAINING ALCOHOL DO YOU HAVE ON A TYPICAL DAY: PATIENT DOES NOT DRINK
HOW OFTEN DO YOU HAVE 6 OR MORE DRINKS ON ONE OCCASION: NEVER
SKIP TO QUESTIONS 9-10: 1
HOW OFTEN DO YOU HAVE A DRINK CONTAINING ALCOHOL: NEVER
AUDIT-C TOTAL SCORE: 0

## 2024-07-01 ASSESSMENT — PAIN SCALES - GENERAL
PAINLEVEL_OUTOF10: 0 - NO PAIN
PAINLEVEL_OUTOF10: 0 - NO PAIN
PAINLEVEL_OUTOF10: 6
PAINLEVEL_OUTOF10: 0 - NO PAIN
PAINLEVEL_OUTOF10: 2
PAINLEVEL_OUTOF10: 0 - NO PAIN
PAINLEVEL_OUTOF10: 2
PAINLEVEL_OUTOF10: 0 - NO PAIN

## 2024-07-01 ASSESSMENT — PATIENT HEALTH QUESTIONNAIRE - PHQ9
1. LITTLE INTEREST OR PLEASURE IN DOING THINGS: NOT AT ALL
SUM OF ALL RESPONSES TO PHQ9 QUESTIONS 1 & 2: 0
2. FEELING DOWN, DEPRESSED OR HOPELESS: NOT AT ALL

## 2024-07-01 ASSESSMENT — COGNITIVE AND FUNCTIONAL STATUS - GENERAL
DAILY ACTIVITIY SCORE: 24
MOBILITY SCORE: 24
PATIENT BASELINE BEDBOUND: NO
MOBILITY SCORE: 24
DAILY ACTIVITIY SCORE: 24

## 2024-07-01 ASSESSMENT — COLUMBIA-SUICIDE SEVERITY RATING SCALE - C-SSRS
2. HAVE YOU ACTUALLY HAD ANY THOUGHTS OF KILLING YOURSELF?: NO
6. HAVE YOU EVER DONE ANYTHING, STARTED TO DO ANYTHING, OR PREPARED TO DO ANYTHING TO END YOUR LIFE?: NO
1. IN THE PAST MONTH, HAVE YOU WISHED YOU WERE DEAD OR WISHED YOU COULD GO TO SLEEP AND NOT WAKE UP?: NO

## 2024-07-01 ASSESSMENT — PAIN DESCRIPTION - LOCATION: LOCATION: CHEST

## 2024-07-01 NOTE — SIGNIFICANT EVENT
Family at bedside, no change in assessment   Rinvoq Pregnancy And Lactation Text: Based on animal studies, Rinvoq may cause embryo-fetal harm when administered to pregnant women.  The medication should not be used in pregnancy.  Breastfeeding is not recommended during treatment and for 6 days after the last dose.

## 2024-07-01 NOTE — CONSULTS
Inpatient consult to Cardiology  Consult performed by: Rika Caruso PA-C  Consult ordered by: JUAN DAVID Montaño-CNP        History Of Present Illness:    Carmelita Mcdonald is a 53 y.o. female presenting with Central chest pain, burning at times, sometimes stabbing in nature. Pt reports pain has been more constant/consistent since her stress test last week. States that pain similar to this occurred prior to the stress test, prompting the initial evaluation. Pain not related to exertion, associated with shortness of breath and nausea. In addition, pt reports significant fatigue after stress test. Had been initiated on BB at that time as well.      Last Recorded Vitals:  Vitals:    07/01/24 0347 07/01/24 0420 07/01/24 0621 07/01/24 0623   BP: 106/69 136/81 122/73 117/74   BP Location:  Right arm Left arm Right arm   Patient Position:  Lying     Pulse: 71 79     Resp: 16 18     Temp:  36.4 °C (97.5 °F)     TempSrc:  Temporal     SpO2: 96% 96%     Weight:    74.1 kg (163 lb 4.8 oz)   Height:           Last Labs:  CBC - 7/1/2024: 12:56 AM  10.2 16.2 222    48.0      CMP - 7/1/2024: 12:56 AM  9.5 7.4 16 --- 0.5   _ 4.6 16 86      PTT - No results in last year.  1.0   10.7 _     Troponin I, High Sensitivity   Date/Time Value Ref Range Status   07/01/2024 01:41 AM <3 0 - 13 ng/L Final   07/01/2024 12:56 AM <3 0 - 13 ng/L Final     BNP   Date/Time Value Ref Range Status   07/01/2024 12:56 AM 31 0 - 99 pg/mL Final     POC HEMOGLOBIN A1c   Date/Time Value Ref Range Status   02/21/2024 11:43 AM 7.7 (A) 4.2 - 6.5 % Final     POCT Hemoglobin A1C   Date/Time Value Ref Range Status   05/15/2024 01:37 PM 7.8 (A) 4.3 - 5.6 % Final     Comment:     Location:[facility], [address] [place] [address] Rd, [place], [place], [address]  Point of care (POC) Hemoglobin A1c (HGBA1C) testing is intended to assess  glucose control and provide a management tool for patients known to have  diabetes and their healthcare providers.   Target HGBA1C levels may depend on  specific clinical circumstances.  POC HGBA1C is not intended for use as a  diagnostic or screening test; laboratory-based testing should be used for  diagnostic purposes.  The following information is supplemental and may not  be applicable to specific diabetes management situations:  The POC device   provides a normal range of 4.2% to 6.5% for the HGBA1C POC test.  However, the American Diabetes Association  guidelines indicate that  patients with HGBA1C in the range of 5.7% to 6.4% are at increased risk for  development of diabetes and that intervention by lifestyle modification may  be beneficial.  A HGBA1C level greater than or equal to 6.5% is considered  diagnostic of diabetes, pending confirmatory testing.  Use of HGBA1C testing  to evaluate glucose control may not be appropriate for patients with  hemoglobin variants or other conditions (e.g. anemia) that alter red blood  cell lifespan.   10/11/2023 11:42 AM 8.0 (A) 4.2 - 5.6 % Final     Comment:     Location:[facility], [address] [place] [address], [place], [place], [address]  Point of care (POC) Hemoglobin A1c (HGBA1C) testing is intended to assess  glucose control and provide a management tool for patients known to have  diabetes and their healthcare providers.  Target HGBA1C levels may depend on  specific clinical circumstances.  POC HGBA1C is not intended for use as a  diagnostic or screening test; laboratory-based testing should be used for  diagnostic purposes.  The following information is supplemental and may not  be applicable to specific diabetes management situations:  The POC device   provides a normal range of 4.2% to 6.5% for the HGBA1C POC test.  However, the American Diabetes Association  guidelines indicate that  patients with HGBA1C in the range of 5.7% to 6.4% are at increased risk for  development of diabetes and that intervention by lifestyle modification  may  be beneficial.  A HGBA1C level greater than or equal to 6.5% is considered  diagnostic of diabetes, pending confirmatory testing.  Use of HGBA1C testing  to evaluate glucose control may not be appropriate for patients with  hemoglobin variants or other conditions (e.g. anemia) that alter red blood  cell lifespan.     LDL Calculated   Date/Time Value Ref Range Status   07/01/2024 05:56 AM 34 <=99 mg/dL Final     Comment:                                 Near   Borderline      AGE      Desirable  Optimal    High     High     Very High     0-19 Y     0 - 109     ---    110-129   >/= 130     ----    20-24 Y     0 - 119     ---    120-159   >/= 160     ----      >24 Y     0 -  99   100-129  130-159   160-189     >/=190     03/06/2024 10:05 AM 77 <=99 mg/dL Final     Comment:                                 Near   Borderline      AGE      Desirable  Optimal    High     High     Very High     0-19 Y     0 - 109     ---    110-129   >/= 130     ----    20-24 Y     0 - 119     ---    120-159   >/= 160     ----      >24 Y     0 -  99   100-129  130-159   160-189     >/=190       VLDL   Date/Time Value Ref Range Status   07/01/2024 05:56 AM 17 0 - 40 mg/dL Final   03/06/2024 10:05 AM 30 0 - 40 mg/dL Final   02/07/2023 11:03 AM 45 (H) 0 - 40 mg/dL Final   12/05/2022 09:12 AM 35 0 - 40 mg/dL Final   02/14/2022 10:57 AM 24 0 - 40 mg/dL Final      Last I/O:  I/O last 3 completed shifts:  In: 1050 (14.2 mL/kg) [I.V.:50 (0.7 mL/kg); IV Piggyback:1000]  Out: - (0 mL/kg)   Weight: 74.1 kg     Past Cardiology Tests (Last 3 Years):  EKG:  ECG 12 lead (Ancillary Performed) 07/01/2024 0018 NSR, 73 bpm, isolated TWI in V1. No acute ischemic changes.     Echo:  Transthoracic echo (TTE) complete 06/27/2024  CONCLUSIONS:   1. The left ventricular systolic function is normal, with a visually estimated ejection fraction of 60-65%.   2. Spectral Doppler shows an impaired relaxation pattern of left ventricular diastolic filling.   3. There is  normal right ventricular global systolic function.   4. There is trace-mild mitral, tricuspid and pulmonic regurgitation.    Ejection Fractions:  EF   Date/Time Value Ref Range Status   06/27/2024 12:46 PM 63 %      Cath:  No results found for this or any previous visit from the past 1095 days.    Stress Test:  Nuclear Stress Test 06/27/2024  IMPRESSION:  1. Suspicion of moderate ischemia along the small territory of the  mid anterior wall. Further evaluation and/or treatment would be  warranted.  2. No evidence of prior myocardial infarction.  3. The left ventricle is normal in size.  4. Normal LV wall motion with a post-stress LV EF estimated at  greater than 65%.  -----    Imaging:  CXR (07/01/24):  IMPRESSION:  No acute cardiopulmonary disease.        Past Medical History:  She has a past medical history of Cervical radiculopathy, acute (07/11/2023), Depression, History of tonsillectomy (07/11/2023), Nontraumatic tear of right rotator cuff (07/11/2023), Peripheral neuropathy, Rotator cuff tear (07/11/2023), and Type 2 diabetes mellitus (Multi).    Past Surgical History:  She has a past surgical history that includes Hysterectomy (06/30/2020) and Spine surgery.      Social History:  She reports that she has been smoking cigarettes. She started smoking about 38 years ago. She has a 38.5 pack-year smoking history. She has never used smokeless tobacco. She reports current alcohol use. She reports that she does not use drugs.    Family History:  Family History   Problem Relation Name Age of Onset    Diabetes Mother      Hypertension Mother      Hyperlipidemia Mother      Breast cancer Mother  54    Diabetes Father      Hypertension Father          Allergies:  Codeine    Inpatient Medications:  Scheduled medications   Medication Dose Route Frequency    [START ON 7/2/2024] aspirin  81 mg oral Daily    atorvastatin  80 mg oral Nightly    docusate sodium  100 mg oral BID    DULoxetine  30 mg oral Daily    insulin lispro   0-5 Units subcutaneous q6h    lisinopril  2.5 mg oral Daily    metoprolol tartrate  25 mg oral BID    nicotine  1 patch transdermal Daily    Followed by    [START ON 8/12/2024] nicotine  1 patch transdermal Daily    Followed by    [START ON 8/26/2024] nicotine  1 patch transdermal Daily    oxygen   inhalation Continuous - Inhalation    ticagrelor  90 mg oral BID     PRN medications   Medication    dextrose    dextrose    glucagon    glucagon     Continuous Medications   Medication Dose Last Rate    dextrose 5%-0.45 % sodium chloride  75 mL/hr 75 mL/hr (07/01/24 0558)    heparin  0-4,000 Units/hr 900 Units/hr (07/01/24 0700)     Outpatient Medications:  Current Outpatient Medications   Medication Instructions    aspirin 81 mg, oral, Daily    atorvastatin (LIPITOR) 80 mg, oral, Daily    cholecalciferol (VITAMIN D-3) 25 mcg, oral, Daily    cyanocobalamin, vitamin B-12, (Vitamin B-12) 1,000 mcg tablet extended release 1 tablet, oral, Daily    DULoxetine (CYMBALTA) 30 mg, oral, Daily, Do not crush or chew.    lactobacillus acidophilus & bulgar (Lactinex) 1 million cell chewable tablet 1 tablet, oral, Daily    lisinopril 2.5 mg, oral, Daily    meloxicam (MOBIC) 15 mg, oral, Daily PRN    metFORMIN XR (GLUCOPHAGE-XR) 1,000 mg, oral, 2 times daily    metoprolol tartrate (LOPRESSOR) 25 mg, oral, 2 times daily    OneTouch Verio Flex meter misc Use as directed    OneTouch Verio test strips strip Use as instructed to check blood sugar 3 times daily. E11.65       Physical Exam:  Physical Exam  Constitutional:       Appearance: Normal appearance.   HENT:      Head: Normocephalic.      Nose: Nose normal.      Mouth/Throat:      Mouth: Mucous membranes are moist.   Eyes:      Conjunctiva/sclera: Conjunctivae normal.   Cardiovascular:      Rate and Rhythm: Normal rate and regular rhythm.      Heart sounds: No murmur heard.  Pulmonary:      Effort: Pulmonary effort is normal.      Breath sounds: Normal breath sounds.   Abdominal:       Palpations: Abdomen is soft.   Musculoskeletal:         General: Normal range of motion.      Cervical back: Normal range of motion.      Right lower leg: No edema.      Left lower leg: No edema.   Skin:     General: Skin is warm and dry.      Capillary Refill: Capillary refill takes less than 2 seconds.   Neurological:      General: No focal deficit present.      Mental Status: She is alert. Mental status is at baseline.   Psychiatric:         Mood and Affect: Mood normal.         Behavior: Behavior normal.            Assessment/Plan   Carmelita Mcdonald is a 54 yo female with a PMH of HTN, HLD, DM II who presented with chest pain, recent + stress test (06/28/2024).     #Atypical chest pain  #HTN  #HLD  #DM Type II  #Current smoker    -NPO for [facility] today (mild nonobstructive CAD)  -Echocardiogram reviewed from 6/27/2024   -Can d/c heparin gtt, brilinta  -Recommend continuing ASA, Atorvastatin  -d/c Metoprolol  -Start Amlodipine 5mg daily, can c/w lisinpril d/t diabetic status  -ok for d/c per CV standpoint   -Will sign off    Peripheral IV 07/01/24 20 G Left;Posterior Forearm (Active)   Site Assessment Clean;Dry;Intact 07/01/24 0840   Dressing Status Clean;Dry 07/01/24 0840   Number of days: 0       Peripheral IV 07/01/24 20 G Left;Proximal;Ventral Forearm (Active)   Site Assessment Clean;Dry;Intact 07/01/24 0840   Dressing Status Clean;Dry 07/01/24 0840   Number of days: 0       Code Status:  Full Code    I spent  minutes in the professional and overall care of this patient.        Rika Caruso PA-C

## 2024-07-01 NOTE — SIGNIFICANT EVENT
Pt returned to [facility] 3 from lab 2, right radial band in place, pt resting comfortably, sitting up, eating crackers and drinking coffee, no c/o at this itme

## 2024-07-01 NOTE — PROGRESS NOTES
Carmelita Mcdonald is a 53 y.o. female on day 0 of admission presenting with Unstable angina (Multi).      Subjective   Denies CP, SOB. Has not slept much, just got to the floor a few hours ago.         Objective     Last Recorded Vitals  /74 (BP Location: Right arm)   Pulse 79   Temp 36.4 °C (97.5 °F) (Temporal)   Resp 18   Wt 74.1 kg (163 lb 4.8 oz)   SpO2 96%   Intake/Output last 3 Shifts:    Intake/Output Summary (Last 24 hours) at 7/1/2024 0853  Last data filed at 7/1/2024 0240  Gross per 24 hour   Intake 1050 ml   Output --   Net 1050 ml       Admission Weight  Weight: 73.5 kg (162 lb) (07/01/24 0019)    Daily Weight  07/01/24 : 74.1 kg (163 lb 4.8 oz)    Image Results  XR chest 1 view  Narrative: STUDY:  Chest Radiograph;  7/1/2024 12:48 AM  INDICATION:  Chest pain.  COMPARISON:  3/12/2024 CT Chest  ACCESSION NUMBER(S):  [id number]  ORDERING CLINICIAN:  RAMONITA DARDEN  TECHNIQUE:  Frontal chest was obtained at 00:48 hours.  FINDINGS:  CARDIOMEDIASTINAL SILHOUETTE:  Cardiomediastinal silhouette is normal in size and configuration.     LUNGS:  Lungs are clear.     ABDOMEN:  No remarkable upper abdominal findings.     BONES:  No acute osseous changes.  Cervical fusion hardware is noted.  Impression: No acute cardiopulmonary disease.  Signed by Suresh Wilde      Physical Exam  Physical Exam  Gen: NAD  Eyes:  EOM intact  ENT: MMM  Neck: No JVD  Respiratory: CTAB, no wheezes/rhonchi  Cardiac: RRR, no murmurs rubs or gallops, non tender over chest wall  Abdomen: soft, NT, +BS  Extremities: no edema or cyanosis  Neuro: No focal deficits, alert and oriented x 3  Psych:  appropriate mood and behavior      Assessment/Plan      Principal Problem:    Unstable angina (Multi)  -in the setting of HTN, HLD, DM2, current smoker  -nuclear stress test on 6/27/24:  suspicion of moderate ischemia along small territory of mid anterior wall  -echo 6/27/24: EF 60-65%, impaired relaxation, trace valve disease  -per EMR,  cardiology was in discussion on possible need for R/LHC  -continue NPO  -continue heparin drip and Brilinta, ASA/statin  -lisinopril, metoprolol  -cardiology consult  -lipid panel WNL  -continue tele    DM  -A1C 7.8% May 2024  -hold metformin  -accuchecks and ISS    HTN/HLD  -home meds    Hypomag  -resolved    Nicotine Use Disorder  -nicotine patch  -counseled    DVT prophy  -heparin gtt    Dispo: OBS, NPO pending cardiology evaluation  D/w Dr Cipriano Acuna, PA-C

## 2024-07-01 NOTE — NURSING NOTE
Discharge instructions reviewed with patient and spouse. Educated on medication changes and new prescriptions. Follow up appointments noted. IV x2 removed, intact. No additional questions.

## 2024-07-01 NOTE — ED PROVIDER NOTES
History/Exam limitations: none  HPI was provided by patient    HPI:    Chief Complaint   Patient presents with    Chest Pain        Carmelita Mcdonald is a 53 y.o. female presents with chief complaint of chest pain.  Chest pain she states she noticed been ongoing since Thursday when she had a stress test that was inconclusive.  States she is supposed to follow-up with Dr. Aguirre for a heart cath.  Her pain is pressure in her chest going into her left shoulder and into her back.  It has been worsening since Thursday and now is nauseous which is what brought her here to the ER.    denies any recent travel recent trauma recent immobilization history of PE or DVT, unilateral leg swelling or recent surgery and not taking any hormones.  No history of connective tissue disorders or blood disorder.       ROS:All other review of systems are negative except as noted above and HPI or ROS. (Bolded if positive)  CONSTITUTIONAL fever, chills.  EYES:      blurry vision, change in vision  ENT sore throat, congestion, rhinorrhea.  CARDIOVASCULAR palpitations, swelling, chest pain  RESPIRATORY cough, shortness of breath, wheeze  GI abdominal pain, nausea, vomiting, diarrhea.  GENITOURINARY dysuria, hematuria, frequency.  MUSCULOSKELETAL deformity, neck pain.  SKIN rash, color change.  NEUROLOGIC  headache, numbness, focal weakness.    Physical exam  General/Constitutional: Alert, oriented , cooperative,  in no acute distress.  Head: normocephalic, atraumatic  Skin: Intact,  dry skin, no lesions.  Eyes: PERRL, EOMs intact,  Conjunctiva pink with no erythema or exudates. No scleral icterus.   ENT: No external deformities. Nares patent, mucus membranes moist.  Pharynx clear, uvula midline.   Neck: Supple, without meningismus. Trachea at midline. No lymphadenopathy. No JVD  Pulmonary: Clear bilaterally. No crackles, rhonchi or wheezing.   Cardiac: Regular rate and regular rhythm.  Pulses 2+ throughout upper and lower extremities.  No  murmur, gallop, rub.   Abdomen: Soft, nontender, active bowel sounds.  No palpable organomegaly.  No rebound or guarding.  No CVA tenderness.  No pulsatile mass  Genitourinary: Exam deferred.  Musculoskeletal: Full range of motion, no deformity. Pulses full and equal. Non-edematous.  Neurological: Alert and oriented to person place and time.  no focal neuro deficits.  Sensation intact throughout.  Neurovascular intact in bilateral upper and lower extremities  Psychiatric: Appropriate mood and affect. Calm.       Past Medical History:   Diagnosis Date    Cervical radiculopathy, acute 07/11/2023    Depression     History of tonsillectomy 07/11/2023    Hyperlipidemia     Hypertension     Nontraumatic tear of right rotator cuff 07/11/2023    Peripheral neuropathy     Rotator cuff tear 07/11/2023    Type 2 diabetes mellitus (Multi)       Social History     Socioeconomic History    Marital status:      Spouse name: None    Number of children: None    Years of education: None    Highest education level: None   Occupational History    None   Tobacco Use    Smoking status: Every Day     Current packs/day: 1.00     Average packs/day: 1 pack/day for 38.5 years (38.5 ttl pk-yrs)     Types: Cigarettes     Start date: 1986    Smokeless tobacco: Never    Tobacco comments:     Shared decision making: Discussed LDCT. Interested in lung cancer screening and pursuing treatment options if abnormality found. Patient is in agreement with this   Vaping Use    Vaping status: Never Used   Substance and Sexual Activity    Alcohol use: Yes     Comment: social    Drug use: Never    Sexual activity: Defer   Other Topics Concern    None   Social History Narrative    None     Social Determinants of Health     Financial Resource Strain: Low Risk  (7/1/2024)    Overall Financial Resource Strain (CARDIA)     Difficulty of Paying Living Expenses: Not very hard   Food Insecurity: Not on file   Transportation Needs: No Transportation Needs  (7/1/2024)    PRAPARE - Transportation     Lack of Transportation (Medical): No     Lack of Transportation (Non-Medical): No   Physical Activity: Not on file   Stress: Not on file   Social Connections: Not on file   Intimate Partner Violence: Not on file   Housing Stability: Low Risk  (7/1/2024)    Housing Stability Vital Sign     Unable to Pay for Housing in the Last Year: No     Number of Places Lived in the Last Year: 1     Unstable Housing in the Last Year: No     Current Outpatient Medications   Medication Instructions    amLODIPine (NORVASC) 2.5 mg, oral, Daily    aspirin 81 mg, oral, Daily    atorvastatin (LIPITOR) 80 mg, oral, Daily    cholecalciferol (VITAMIN D-3) 25 mcg, oral, Daily    cyanocobalamin, vitamin B-12, (Vitamin B-12) 1,000 mcg tablet extended release 1 tablet, oral, Daily    DULoxetine (CYMBALTA) 30 mg, oral, Daily, Do not crush or chew.    lactobacillus acidophilus & bulgar (Lactinex) 1 million cell chewable tablet 1 tablet, oral, Daily    lisinopril 2.5 mg, oral, Daily    meloxicam (MOBIC) 15 mg, oral, Daily PRN    [START ON 7/4/2024] metFORMIN XR (GLUCOPHAGE-XR) 1,000 mg, oral, 2 times daily, Do not start taking this again until July 4, 2024    OneTouch Verio Flex meter misc Use as directed    OneTouch Verio test strips strip Use as instructed to check blood sugar 3 times daily. E11.65    pantoprazole (PROTONIX) 40 mg, oral, Daily before breakfast, Do not crush, chew, or split.     Allergies   Allergen Reactions    Codeine Hallucinations           ED Triage Vitals   Temp Pulse Resp BP   -- -- -- --      SpO2 Temp src Heart Rate Source Patient Position   -- -- -- --      BP Location FiO2 (%)     -- --                 Labs and Imaging  Cardiac Catheterization Procedure   Final Result      XR chest 1 view   Final Result   No acute cardiopulmonary disease.   Signed by Suersh Wilde        Labs Reviewed   CBC WITH AUTO DIFFERENTIAL - Abnormal       Result Value    WBC 10.2      nRBC 0.0      RBC  5.41 (*)     Hemoglobin 16.2 (*)     Hematocrit 48.0 (*)     MCV 89      MCH 29.9      MCHC 33.8      RDW 13.1      Platelets 222      Neutrophils % 52.4      Immature Granulocytes %, Automated 0.3      Lymphocytes % 36.6      Monocytes % 6.9      Eosinophils % 3.3      Basophils % 0.5      Neutrophils Absolute 5.36      Immature Granulocytes Absolute, Automated 0.03      Lymphocytes Absolute 3.75      Monocytes Absolute 0.71      Eosinophils Absolute 0.34      Basophils Absolute 0.05     BASIC METABOLIC PANEL - Abnormal    Glucose 125 (*)     Sodium 138      Potassium 3.8      Chloride 103      Bicarbonate 25      Anion Gap 14      Urea Nitrogen 18      Creatinine 0.73      eGFR >90      Calcium 9.5     MAGNESIUM - Abnormal    Magnesium 1.48 (*)    POCT GLUCOSE - Abnormal    POCT Glucose 127 (*)    POCT GLUCOSE - Abnormal    POCT Glucose 137 (*)    B-TYPE NATRIURETIC PEPTIDE - Normal    BNP 31      Narrative:        <100 pg/mL - Heart failure unlikely  100-299 pg/mL - Intermediate probability of acute heart                  failure exacerbation. Correlate with clinical                  context and patient history.    >=300 pg/mL - Heart Failure likely. Correlate with clinical                  context and patient history.    BNP testing is performed using different testing methodology at [facility] than at [facility]. Direct result comparisons should only be made within the same method.      INFLUENZA A AND B PCR - Normal    Flu A Result Not Detected      Flu B Result Not Detected      Narrative:     This assay is an in vitro diagnostic multiplex nucleic acid amplification test for the detection and discrimination of Influenza A & B from nasopharyngeal specimens, and has been validated for use at [facility]. Negative results do not preclude Influenza A/B infections, and should not be used as the sole basis for diagnosis, treatment, or other management decisions. If  Influenza A/B and RSV PCR results are negative, testing for Parainfluenza virus, Adenovirus and Metapneumovirus is routinely performed for [facility] [place] and intensive care inpatients, and is available on other patients by placing an add-on request.   SARS-COV-2 PCR - Normal    Coronavirus 2019, PCR Not Detected      Narrative:     This assay has received FDA Emergency Use Authorization (EUA) and is only authorized for the duration of time that circumstances exist to justify the authorization of the emergency use of in vitro diagnostic tests for the detection of SARS-CoV-2 virus and/or diagnosis of COVID-19 infection under section 564(b)(1) of the Act, 21 U.S.C. 360bbb-3(b)(1). This assay is an in vitro diagnostic nucleic acid amplification test for the qualitative detection of SARS-CoV-2 from nasopharyngeal specimens and has been validated for use at [facility]. Negative results do not preclude COVID-19 infections and should not be used as the sole basis for diagnosis, treatment, or other management decisions.     TSH WITH REFLEX TO FREE T4 IF ABNORMAL - Normal    Thyroid Stimulating Hormone 2.27      Narrative:     TSH testing is performed using different testing methodology at [facility] than at [facility]. Direct result comparisons should only be made within the same method.     D-DIMER, NON VTE - Normal    D-Dimer Non VTE, Quant (ng/mL FEU) 423      Narrative:     The D-Dimer assay is reported in ng/mL Fibrinogen Equivalent Units (FEU). The results of this assay should NOT be used for the exclusion of Deep Vein Thrombosis and/or Pulmonary Embolism.   SERIAL TROPONIN-INITIAL - Normal    Troponin I, High Sensitivity <3      Narrative:     Less than 99th percentile of normal range cutoff-  Female and children under 18 years old <14 ng/L; Male <21 ng/L: Negative  Repeat testing should be performed if clinically indicated.     Female and children under 18  years old 14-50 ng/L; Male 21-50 ng/L:  Consistent with possible cardiac damage and possible increased clinical   risk. Serial measurements may help to assess extent of myocardial damage.     >50 ng/L: Consistent with cardiac damage, increased clinical risk and  myocardial infarction. Serial measurements may help assess extent of   myocardial damage.      NOTE: Children less than 1 year old may have higher baseline troponin   levels and results should be interpreted in conjunction with the overall   clinical context.     NOTE: Troponin I testing is performed using a different   testing methodology at [facility] than at other   [facility]. Direct result comparisons should only   be made within the same method.   SERIAL TROPONIN, 1 HOUR - Normal    Troponin I, High Sensitivity <3      Narrative:     Less than 99th percentile of normal range cutoff-  Female and children under 18 years old <14 ng/L; Male <21 ng/L: Negative  Repeat testing should be performed if clinically indicated.     Female and children under 18 years old 14-50 ng/L; Male 21-50 ng/L:  Consistent with possible cardiac damage and possible increased clinical   risk. Serial measurements may help to assess extent of myocardial damage.     >50 ng/L: Consistent with cardiac damage, increased clinical risk and  myocardial infarction. Serial measurements may help assess extent of   myocardial damage.      NOTE: Children less than 1 year old may have higher baseline troponin   levels and results should be interpreted in conjunction with the overall   clinical context.     NOTE: Troponin I testing is performed using a different   testing methodology at [facility] than at other   [facility]. Direct result comparisons should only   be made within the same method.   TROPONIN I, HIGH SENSITIVITY - Normal    Troponin I, High Sensitivity <3      Narrative:     Less than 99th percentile of normal range cutoff-  Female and  children under 18 years old <14 ng/L; Male <21 ng/L: Negative  Repeat testing should be performed if clinically indicated.     Female and children under 18 years old 14-50 ng/L; Male 21-50 ng/L:  Consistent with possible cardiac damage and possible increased clinical   risk. Serial measurements may help to assess extent of myocardial damage.     >50 ng/L: Consistent with cardiac damage, increased clinical risk and  myocardial infarction. Serial measurements may help assess extent of   myocardial damage.      NOTE: Children less than 1 year old may have higher baseline troponin   levels and results should be interpreted in conjunction with the overall   clinical context.     NOTE: Troponin I testing is performed using a different   testing methodology at [facility] than at other   [facility]. Direct result comparisons should only   be made within the same method.   MAGNESIUM - Normal    Magnesium 2.00     PROTIME-INR - Normal    Protime 10.7      INR 1.0     HEPARIN ASSAY - Normal    Heparin Unfractionated 0.4      Narrative:     The therapeutic reference range for UFH may be either 0.3-0.6 IU/mL or 0.3-0.7 IU/mL based on the clinical setting for anticoagulant therapy and the associated nomogram used. For Heparin dosing guidelines based on clinical scenario and Heparin Assay results, please refer to local Pharmacy and the [facility] Guidelines for Anticoagulation Therapy available on the [facility] intranet at: [email or web address]   TROPONIN SERIES- (INITIAL, 1 HR)    Narrative:     The following orders were created for panel order Troponin I Series, High Sensitivity (0, 1 HR).  Procedure                               Abnormality         Status                     ---------                               -----------         ------                     Troponin I, High Sensiti...[[id number]]  Normal               Final result               Troponin, High Sensitivi...[[id number]]  Normal              Final result                 Please view results for these tests on the individual orders.   LIPID PANEL    Cholesterol 87      HDL-Cholesterol 36.5      Cholesterol/HDL Ratio 2.4      LDL Calculated 34      VLDL 17      Triglycerides 85      Non HDL Cholesterol 51                 Procedure  Procedures       Medical Decision Making:     External Records Reviewed: I reviewed recent and relevant outside records    ED Course as of 07/02/24 0243   Mon Jul 01, 2024 0026 EKG interpreted by me shows normal sinus rhythm.  Low voltage QRS.  No STEMI.  Rate 73 bpm.  Compared to prior EKG similar findings present [WL]   0059 Already took aspirin today so was given more for a full dose along with nitro and Zofran for nausea. [WL]   0136 MAGNESIUM(!): 1.48 [WL]   0137 Was given magnesium replacement IV [WL]   0210 Cxr IMPRESSION:  No acute cardiopulmonary disease.   [WL]   0225 On reevaluation currently is chest pain-free given the nitro she was given.  Will place Nitropaste. [WL]   0229 Spoke with Carmelita costa for hospitalist service.  Like us to start patient on heparin for unstable angina.  Did not recommend consulting cardiology here from the ER. [WL]      ED Course User Index  [WL] Ronny Macdonald, DO         Diagnoses as of 07/02/24 0243   Chest pain, unspecified type   Low magnesium level         Cardiac Catheterization Procedure   Final Result      XR chest 1 view   Final Result   No acute cardiopulmonary disease.   Signed by Suresh Wilde        Labs Reviewed   CBC WITH AUTO DIFFERENTIAL - Abnormal       Result Value    WBC 10.2      nRBC 0.0      RBC 5.41 (*)     Hemoglobin 16.2 (*)     Hematocrit 48.0 (*)     MCV 89      MCH 29.9      MCHC 33.8      RDW 13.1      Platelets 222      Neutrophils % 52.4      Immature Granulocytes %, Automated 0.3      Lymphocytes % 36.6      Monocytes % 6.9      Eosinophils % 3.3      Basophils % 0.5       Neutrophils Absolute 5.36      Immature Granulocytes Absolute, Automated 0.03      Lymphocytes Absolute 3.75      Monocytes Absolute 0.71      Eosinophils Absolute 0.34      Basophils Absolute 0.05     BASIC METABOLIC PANEL - Abnormal    Glucose 125 (*)     Sodium 138      Potassium 3.8      Chloride 103      Bicarbonate 25      Anion Gap 14      Urea Nitrogen 18      Creatinine 0.73      eGFR >90      Calcium 9.5     MAGNESIUM - Abnormal    Magnesium 1.48 (*)    POCT GLUCOSE - Abnormal    POCT Glucose 127 (*)    POCT GLUCOSE - Abnormal    POCT Glucose 137 (*)    B-TYPE NATRIURETIC PEPTIDE - Normal    BNP 31      Narrative:        <100 pg/mL - Heart failure unlikely  100-299 pg/mL - Intermediate probability of acute heart                  failure exacerbation. Correlate with clinical                  context and patient history.    >=300 pg/mL - Heart Failure likely. Correlate with clinical                  context and patient history.    BNP testing is performed using different testing methodology at [facility] than at [facility]. Direct result comparisons should only be made within the same method.      INFLUENZA A AND B PCR - Normal    Flu A Result Not Detected      Flu B Result Not Detected      Narrative:     This assay is an in vitro diagnostic multiplex nucleic acid amplification test for the detection and discrimination of Influenza A & B from nasopharyngeal specimens, and has been validated for use at [facility]. Negative results do not preclude Influenza A/B infections, and should not be used as the sole basis for diagnosis, treatment, or other management decisions. If Influenza A/B and RSV PCR results are negative, testing for Parainfluenza virus, Adenovirus and Metapneumovirus is routinely performed for [facility] pediatric oncology and intensive care inpatients, and is available on other patients by placing an add-on request.   SARS-COV-2 PCR - Normal     Coronavirus 2019, PCR Not Detected      Narrative:     This assay has received FDA Emergency Use Authorization (EUA) and is only authorized for the duration of time that circumstances exist to justify the authorization of the emergency use of in vitro diagnostic tests for the detection of SARS-CoV-2 virus and/or diagnosis of COVID-19 infection under section 564(b)(1) of the Act, 21 U.S.C. 360bbb-3(b)(1). This assay is an in vitro diagnostic nucleic acid amplification test for the qualitative detection of SARS-CoV-2 from nasopharyngeal specimens and has been validated for use at [facility]. Negative results do not preclude COVID-19 infections and should not be used as the sole basis for diagnosis, treatment, or other management decisions.     TSH WITH REFLEX TO FREE T4 IF ABNORMAL - Normal    Thyroid Stimulating Hormone 2.27      Narrative:     TSH testing is performed using different testing methodology at [facility] than at [facility]. Direct result comparisons should only be made within the same method.     D-DIMER, NON VTE - Normal    D-Dimer Non VTE, Quant (ng/mL FEU) 423      Narrative:     The D-Dimer assay is reported in ng/mL Fibrinogen Equivalent Units (FEU). The results of this assay should NOT be used for the exclusion of Deep Vein Thrombosis and/or Pulmonary Embolism.   SERIAL TROPONIN-INITIAL - Normal    Troponin I, High Sensitivity <3      Narrative:     Less than 99th percentile of normal range cutoff-  Female and children under 18 years old <14 ng/L; Male <21 ng/L: Negative  Repeat testing should be performed if clinically indicated.     Female and children under 18 years old 14-50 ng/L; Male 21-50 ng/L:  Consistent with possible cardiac damage and possible increased clinical   risk. Serial measurements may help to assess extent of myocardial damage.     >50 ng/L: Consistent with cardiac damage, increased clinical risk and  myocardial infarction.  Serial measurements may help assess extent of   myocardial damage.      NOTE: Children less than 1 year old may have higher baseline troponin   levels and results should be interpreted in conjunction with the overall   clinical context.     NOTE: Troponin I testing is performed using a different   testing methodology at [facility] than at other   [facility]. Direct result comparisons should only   be made within the same method.   SERIAL TROPONIN, 1 HOUR - Normal    Troponin I, High Sensitivity <3      Narrative:     Less than 99th percentile of normal range cutoff-  Female and children under 18 years old <14 ng/L; Male <21 ng/L: Negative  Repeat testing should be performed if clinically indicated.     Female and children under 18 years old 14-50 ng/L; Male 21-50 ng/L:  Consistent with possible cardiac damage and possible increased clinical   risk. Serial measurements may help to assess extent of myocardial damage.     >50 ng/L: Consistent with cardiac damage, increased clinical risk and  myocardial infarction. Serial measurements may help assess extent of   myocardial damage.      NOTE: Children less than 1 year old may have higher baseline troponin   levels and results should be interpreted in conjunction with the overall   clinical context.     NOTE: Troponin I testing is performed using a different   testing methodology at [facility] than at other   [facility]. Direct result comparisons should only   be made within the same method.   TROPONIN I, HIGH SENSITIVITY - Normal    Troponin I, High Sensitivity <3      Narrative:     Less than 99th percentile of normal range cutoff-  Female and children under 18 years old <14 ng/L; Male <21 ng/L: Negative  Repeat testing should be performed if clinically indicated.     Female and children under 18 years old 14-50 ng/L; Male 21-50 ng/L:  Consistent with possible cardiac damage and possible increased clinical   risk. Serial  measurements may help to assess extent of myocardial damage.     >50 ng/L: Consistent with cardiac damage, increased clinical risk and  myocardial infarction. Serial measurements may help assess extent of   myocardial damage.      NOTE: Children less than 1 year old may have higher baseline troponin   levels and results should be interpreted in conjunction with the overall   clinical context.     NOTE: Troponin I testing is performed using a different   testing methodology at [facility] than at other   [facility]. Direct result comparisons should only   be made within the same method.   MAGNESIUM - Normal    Magnesium 2.00     PROTIME-INR - Normal    Protime 10.7      INR 1.0     HEPARIN ASSAY - Normal    Heparin Unfractionated 0.4      Narrative:     The therapeutic reference range for UFH may be either 0.3-0.6 IU/mL or 0.3-0.7 IU/mL based on the clinical setting for anticoagulant therapy and the associated nomogram used. For Heparin dosing guidelines based on clinical scenario and Heparin Assay results, please refer to local Pharmacy and the [facility] Guidelines for Anticoagulation Therapy available on the [facility] intranet at: [email or web address]   TROPONIN SERIES- (INITIAL, 1 HR)    Narrative:     The following orders were created for panel order Troponin I Series, High Sensitivity (0, 1 HR).  Procedure                               Abnormality         Status                     ---------                               -----------         ------                     Troponin I, High Sensiti...[[id number]]  Normal              Final result               Troponin, High Sensitivi...[[id number]]  Normal              Final result                 Please view results for these tests on the individual orders.   LIPID PANEL    Cholesterol 87      HDL-Cholesterol 36.5      Cholesterol/HDL Ratio 2.4      LDL Calculated  34      VLDL 17      Triglycerides 85      Non HDL Cholesterol 51         Patient presented for evaluation of chest pain.    Differential included but not limited to ACS, arrhythmia, electrolyte abnormality, PE.   Lab work and imaging were performed.    Patient is a medium to high risk heart score requires continued workup and management of their symptoms and will be admitted to the hospital for further evaluation and treatment.  Plan be to admit to medicine.     I discussed the differential, results and plan with the patient and/or family/friend/caregiver if present.       Note: This note was dictated by speech recognition. Minor errors in transcription may be present.           Ronny Macdonald,   07/01/24 0231       Ronny Macdonald,   07/02/24 0243

## 2024-07-01 NOTE — H&P
History Of Present Illness  Carmelita Mcdonald is a 53 y.o. female who presented to [facility] ER with complaint of worsening chest pain at rest since the beginning of June. She also recently had an abnormal stress test. Initially, she thought the chest pain was GERD. She said the chest pain was off-and-on all day yesterday which it had not been before.  She had sharp pain that was coming from her back radiating into her chest along with left arm pain radiating to her elbow that is achy. Patient said she was nauseated on the way to the ER. She received a nitro in the ER and the chest pain and associated symptoms resolved.  She denied any recent strenuous activity, extra stress, recent upper respiratory symptoms, dyspnea, vomiting, jaw or neck pain or calf pain.   Pertinent workup in the ER included EKG that showed no ST abnormalities, troponin negative x 2, BNP 31, magnesium 1.4, TSH within normal limits, stable H&H, COVID and flu are negative, chest x-ray shows no acute findings.     Past Medical History  She has a past medical history of Cervical radiculopathy, acute (07/11/2023), Depression, History of tonsillectomy (07/11/2023), Nontraumatic tear of right rotator cuff (07/11/2023), Peripheral neuropathy, Rotator cuff tear (07/11/2023), and Type 2 diabetes mellitus (Multi), HPLD and HTN.    Surgical History  She has a past surgical history that includes Hysterectomy (06/30/2020) and Spine surgery, 2 C-sections.     Social History  She reports that she has been smoking cigarettes. She started smoking about 38 years ago. She has a 38.5 pack-year smoking history. She has recently cut back to 3/4 pack a day. She has never used smokeless tobacco. She reports current alcohol use socially. She reports that she does not use drugs.    Family History  Family History   Problem Relation Name Age of Onset    Diabetes Mother      Hypertension Mother      Hyperlipidemia Mother      Breast cancer Mother  54    Diabetes Father       Hypertension Father          Allergies  Codeine    Review of Systems   Constitutional:  Positive for fatigue. Negative for chills and fever.   HENT:  Negative for sinus pressure, sinus pain, sneezing and sore throat.    Eyes:  Negative for pain and redness.   Respiratory:  Negative for cough and shortness of breath.    Cardiovascular:  Positive for chest pain. Negative for palpitations and leg swelling.   Gastrointestinal:  Negative for abdominal distention, abdominal pain, constipation, diarrhea, nausea and vomiting.   Endocrine: Negative for polydipsia.   Genitourinary:  Negative for dysuria and urgency.   Musculoskeletal:  Positive for back pain. Negative for neck pain.   Skin:  Negative for color change and pallor.   Neurological:  Negative for syncope and headaches.   Hematological:  Does not bruise/bleed easily.   Psychiatric/Behavioral:  Negative for agitation, behavioral problems and confusion.      Physical Exam  Vitals reviewed.   Constitutional:       General: She is not in acute distress.     Appearance: Normal appearance. She is not ill-appearing, toxic-appearing or diaphoretic.   HENT:      Head: Normocephalic and atraumatic.      Mouth/Throat:      Mouth: Mucous membranes are moist.      Pharynx: Oropharynx is clear.   Eyes:      General: No scleral icterus.     Extraocular Movements: Extraocular movements intact.      Conjunctiva/sclera: Conjunctivae normal.   Neck:      Vascular: No carotid bruit.   Cardiovascular:      Rate and Rhythm: Normal rate and regular rhythm.      Pulses: Normal pulses.      Heart sounds: No murmur heard.     Comments: No JVD  Pulmonary:      Effort: Pulmonary effort is normal. No respiratory distress.      Breath sounds: Normal breath sounds. No wheezing, rhonchi or rales.      Comments: No use of accessory mucles  Abdominal:      General: Bowel sounds are normal. There is no distension.      Palpations: Abdomen is soft.      Tenderness: There is no abdominal tenderness.  There is no guarding or rebound.   Musculoskeletal:         General: No swelling.      Cervical back: Normal range of motion.      Right lower leg: No edema.      Left lower leg: No edema.   Skin:     General: Skin is warm and dry.   Neurological:      General: No focal deficit present.      Mental Status: She is alert and oriented to person, place, and time.      Motor: No weakness.   Psychiatric:         Mood and Affect: Mood normal.         Behavior: Behavior normal.       Last Recorded Vitals  /65   Pulse 78   Temp 36.3 °C (97.3 °F)   Resp 20   Wt 73.5 kg (162 lb)   SpO2 96%     Relevant Results  Scheduled medications  [START ON 7/2/2024] aspirin, 81 mg, oral, Daily  atorvastatin, 80 mg, oral, Nightly  docusate sodium, 100 mg, oral, BID  heparin, 4,000 Units, intravenous, Once  oxygen, , inhalation, Continuous - Inhalation  ticagrelor, 180 mg, oral, Once   Followed by  ticagrelor, 90 mg, oral, BID      Continuous medications  heparin, 0-4,000 Units/hr      PRN medications    Results for orders placed or performed during the hospital encounter of 07/01/24 (from the past 24 hour(s))   CBC and Auto Differential   Result Value Ref Range    WBC 10.2 4.4 - 11.3 x10*3/uL    nRBC 0.0 0.0 - 0.0 /100 WBCs    RBC 5.41 (H) 4.00 - 5.20 x10*6/uL    Hemoglobin 16.2 (H) 12.0 - 16.0 g/dL    Hematocrit 48.0 (H) 36.0 - 46.0 %    MCV 89 80 - 100 fL    MCH 29.9 26.0 - 34.0 pg    MCHC 33.8 32.0 - 36.0 g/dL    RDW 13.1 11.5 - 14.5 %    Platelets 222 150 - 450 x10*3/uL    Neutrophils % 52.4 40.0 - 80.0 %    Immature Granulocytes %, Automated 0.3 0.0 - 0.9 %    Lymphocytes % 36.6 13.0 - 44.0 %    Monocytes % 6.9 2.0 - 10.0 %    Eosinophils % 3.3 0.0 - 6.0 %    Basophils % 0.5 0.0 - 2.0 %    Neutrophils Absolute 5.36 1.20 - 7.70 x10*3/uL    Immature Granulocytes Absolute, Automated 0.03 0.00 - 0.70 x10*3/uL    Lymphocytes Absolute 3.75 1.20 - 4.80 x10*3/uL    Monocytes Absolute 0.71 0.10 - 1.00 x10*3/uL    Eosinophils Absolute  0.34 0.00 - 0.70 x10*3/uL    Basophils Absolute 0.05 0.00 - 0.10 x10*3/uL   Basic Metabolic Panel   Result Value Ref Range    Glucose 125 (H) 74 - 99 mg/dL    Sodium 138 136 - 145 mmol/L    Potassium 3.8 3.5 - 5.3 mmol/L    Chloride 103 98 - 107 mmol/L    Bicarbonate 25 21 - 32 mmol/L    Anion Gap 14 10 - 20 mmol/L    Urea Nitrogen 18 6 - 23 mg/dL    Creatinine 0.73 0.50 - 1.05 mg/dL    eGFR >90 >60 mL/min/1.73m*2    Calcium 9.5 8.6 - 10.3 mg/dL   B-Type Natriuretic Peptide   Result Value Ref Range    BNP 31 0 - 99 pg/mL   Influenza A, and B PCR   Result Value Ref Range    Flu A Result Not Detected Not Detected    Flu B Result Not Detected Not Detected   Sars-CoV-2 PCR   Result Value Ref Range    Coronavirus 2019, PCR Not Detected Not Detected   Magnesium   Result Value Ref Range    Magnesium 1.48 (L) 1.60 - 2.40 mg/dL   TSH with reflex to Free T4 if abnormal   Result Value Ref Range    Thyroid Stimulating Hormone 2.27 0.44 - 3.98 mIU/L   D-Dimer, Non VTE   Result Value Ref Range    D-Dimer Non VTE, Quant (ng/mL FEU) 423 <=500 ng/mL FEU   Troponin I, High Sensitivity, Initial   Result Value Ref Range    Troponin I, High Sensitivity <3 0 - 13 ng/L   Troponin, High Sensitivity, 1 Hour   Result Value Ref Range    Troponin I, High Sensitivity <3 0 - 13 ng/L     XR chest 1 view    Result Date: 7/1/2024  STUDY: Chest Radiograph;  7/1/2024 12:48 AM INDICATION: Chest pain. COMPARISON: 3/12/2024 CT Chest ACCESSION NUMBER(S): [id number] ORDERING CLINICIAN: RAMONITA DARDEN TECHNIQUE:  Frontal chest was obtained at 00:48 hours. FINDINGS: CARDIOMEDIASTINAL SILHOUETTE: Cardiomediastinal silhouette is normal in size and configuration.  LUNGS: Lungs are clear.  ABDOMEN: No remarkable upper abdominal findings.  BONES: No acute osseous changes.  Cervical fusion hardware is noted.    No acute cardiopulmonary disease. Signed by Suresh Widle     Assessment/Plan   Active Problems:  Unstable Angina  -Abnormal stress test, chest pressure  since the beginning June 2024 that has worsened  -Having chest discomfort on/off all day intermittently that was relieved with nitro   -HEART Score 5  -No EKG changes, troponin negative x's 2  Reji Tangta  Serial troponin/EKG reviewed-monitor  Aspirin and high dose statin therapy started  Cardiology consulted-appreciate recs  ECHO 6/3/24 EF 60-65% and impaired diastolic dysfunction  Lipid level-f/U BNP <100, Mg level 1.4, TSH WNL   All home cardiac meds resumed  Cardiac diet/Daily weights/I/O's  Bilateral BP equal  Telemetry/Pulse ox    Nicotine Use Disorder  Smoking cessation education  Nicotine replacement  Discuss possibly starting Wellbutrin prior to dc    Hypomagnesemia  Mag 1.4  Repleted and recheck    High Fall Risk  Fall precautions  Made aware to ask for help when getting OOB    HTN  Lisinopril    HPLD  Atorvastatin    DM II  A1C  7.8 5/15/24  Metformin on hold since npo  IV fluids while npo    Full Code    DVT Px  SCDs, heparin infusion    Carmelita Green, APRN-CNP  72 min spent interviewing and assessing patient, reviewing chart, placing admission orders, updating MAR, answering questions and coordinating care.

## 2024-07-01 NOTE — PROGRESS NOTES
07/01/24 0922   Discharge Planning   Living Arrangements Spouse/significant other;Children;Parent   Support Systems Spouse/significant other;Parent   Assistance Needed Patient is A&Ox3, on room air at baseline, is independent with ADL's and uses no DME at home, drives. Patient denies further needs upon discharge   Type of Residence Private residence   Number of Stairs to Enter Residence 2   Number of Stairs Within Residence 0   Do you have animals or pets at home? Yes   Type of Animals or Pets dog   Who is requesting discharge planning? Provider   Home or Post Acute Services None   Patient expects to be discharged to: Home no needs   Does the patient need discharge transport arranged? No   Financial Resource Strain   How hard is it for you to pay for the very basics like food, housing, medical care, and heating? Not very   Housing Stability   In the last 12 months, was there a time when you were not able to pay the mortgage or rent on time? N   In the last 12 months, how many places have you lived? 1   In the last 12 months, was there a time when you did not have a steady place to sleep or slept in a shelter (including now)? N   Transportation Needs   In the past 12 months, has lack of transportation kept you from medical appointments or from getting medications? no   In the past 12 months, has lack of transportation kept you from meetings, work, or from getting things needed for daily living? No        07/01/24 1423   Discharge Planning   Patient expects to be discharged to: Patient medically ready for discharge. Patient will discharge home and continues to deny further home going needs. Spouse will provide transport home.   Does the patient need discharge transport arranged? No

## 2024-07-01 NOTE — SIGNIFICANT EVENT
The right radial site is unchanged. The TR band was removed at this time. The site was clean and a dressing was placed over the site. Also, a arm board was placed on the palm of the right hand. Report was called to the floor RN.

## 2024-07-01 NOTE — Clinical Note
Multiple views of the left coronary artery obtained using power injection. Difficulty engaging circumflex

## 2024-07-01 NOTE — DISCHARGE SUMMARY
Discharge Diagnosis  Mild non-obstructive CAD, possible GERD    Issues Requiring Follow-Up  Started on PPI  discontinue metop  Start low dose norvasc  Continue ASA/Statin  Encouraged to stop smoking    Discharge Meds     Your medication list        START taking these medications        Instructions Last Dose Given Next Dose Due   amLODIPine 2.5 mg tablet  Commonly known as: Norvasc      Take 1 tablet (2.5 mg) by mouth once daily.       pantoprazole 40 mg EC tablet  Commonly known as: ProtoNix      Take 1 tablet (40 mg) by mouth once daily in the morning. Take before meals. Do not crush, chew, or split.              CHANGE how you take these medications        Instructions Last Dose Given Next Dose Due   metFORMIN  mg 24 hr tablet  Commonly known as: Glucophage-XR  Start taking on: July 4, 2024  What changed:   additional instructions  These instructions start on July 4, 2024. If you are unsure what to do until then, ask your doctor or other care provider.      Take 2 tablets (1,000 mg) by mouth 2 times a day. Do not start taking this again until July 4, 2024 Do not fill before July 4, 2024.              CONTINUE taking these medications        Instructions Last Dose Given Next Dose Due   aspirin 81 mg chewable tablet      Chew 1 tablet (81 mg) once daily.       atorvastatin 80 mg tablet  Commonly known as: Lipitor      Take 1 tablet (80 mg) by mouth once daily.       cholecalciferol 25 MCG (1000 UT) capsule  Commonly known as: Vitamin D-3           cyanocobalamin (vitamin B-12) 1,000 mcg tablet extended release  Commonly known as: Vitamin B-12           DULoxetine 30 mg DR capsule  Commonly known as: Cymbalta      Take 1 capsule (30 mg) by mouth once daily. Do not crush or chew.       lactobacillus acidophilus & bulgar 1 million cell chewable tablet  Commonly known as: Lactinex           lisinopril 2.5 mg tablet      Take 1 tablet (2.5 mg) by mouth once daily.       meloxicam 15 mg tablet  Commonly known as:  Mobic      Take 1 tablet (15 mg) by mouth once daily as needed for moderate pain (4 - 6).       OneTouch Verio Flex meter misc  Generic drug: blood-glucose meter           OneTouch Verio test strips strip  Generic drug: blood sugar diagnostic                  STOP taking these medications      metoprolol tartrate 25 mg tablet  Commonly known as: Lopressor                  Where to Get Your Medications        These medications were sent to [id number] - [place], [place] - [address] [facility] [address]  [address] [address]      Phone: [phone]   amLODIPine 2.5 mg tablet  pantoprazole 40 mg EC tablet       Information about where to get these medications is not yet available    Ask your nurse or doctor about these medications  metFORMIN  mg 24 hr tablet         Test Results Pending At Discharge  Pending Labs       No current pending labs.            Hospital Course   From [facility]:  Carmelita Mcdonald is a 53 y.o. female who presented to [facility] ER with complaint of worsening chest pain at rest since the beginning of June. She also recently had an abnormal stress test. Initially, she thought the chest pain was GERD. She said the chest pain was off-and-on all day yesterday which it had not been before.  She had sharp pain that was coming from her back radiating into her chest along with left arm pain radiating to her elbow that is achy. Patient said she was nauseated on the way to the ER. She received a nitro in the ER and the chest pain and associated symptoms resolved.  She denied any recent strenuous activity, extra stress, recent upper respiratory symptoms, dyspnea, vomiting, jaw or neck pain or calf pain.   Pertinent workup in the ER included EKG that showed no ST abnormalities, troponin negative x 2, BNP 31, magnesium 1.4, TSH within normal limits, stable H&H, COVID and flu are negative, chest x-ray shows no acute findings.    She was taken to cath lab with following findings: mild  nonobstructive CAD on cath.  Cleared for discharge with some med adjustments by cardiology.  Encouraged to quit smoking.  On day of discharge, patient denied CP, SOB, n/v/diarrhea/constipation, was tolerating diet and ambulating without issue.  Patient appeared hemodynamically stable at time of discharge. Discussed with attending physician who agreed with discharge plan.       Pertinent Physical Exam At Time of Discharge  Physical Exam  Physical Exam  Gen: NAD  Eyes:  EOM intact  ENT: MMM  Neck: No JVD  Respiratory: CTAB, no wheezes/rhonchi  Cardiac: RRR, no murmurs rubs or gallops  Abdomen: soft, NT, +BS  Extremities: no edema or cyanosis, right wrist cath site with arm board  Neuro: No focal deficits, alert and oriented x 3  Psych:  appropriate mood and behavior    Outpatient Follow-Up  Future Appointments   Date Time Provider Department [place]   7/18/2024  9:00 AM [address]   8/6/2024  1:00 PM JUAN DAVID Love-CNP [id number]   11/27/2024  9:00 AM Mery Wall PA-C [id number]     Follow up with PCP in 1 week, cardiology in 1 month    Mckayla Acuna PA-C

## 2024-07-02 ENCOUNTER — PATIENT OUTREACH (OUTPATIENT)
Dept: PRIMARY CARE | Facility: CLINIC | Age: 54
End: 2024-07-02
Payer: COMMERCIAL

## 2024-07-02 ENCOUNTER — TELEPHONE (OUTPATIENT)
Dept: PRIMARY CARE | Facility: CLINIC | Age: 54
End: 2024-07-02
Payer: COMMERCIAL

## 2024-07-02 NOTE — PROGRESS NOTES
Discharge Facility: [facility]  Discharge Diagnosis: Mild non-obstructive CAD, possible GERD   Admission Date: 7/1/24  Discharge Date: 7/1/24    PCP Appointment Date: No avail apt. Task to office  Specialist Appointment Date: Unknown  Hospital Encounter and Summary: Linked     See discharge assessment below for further details    Engagement  Call Start Time: 1440 (7/2/2024  2:42 PM)    Medications  Medications reviewed with patient/caregiver?: Yes (7/2/2024  2:42 PM)  Is the patient having any side effects they believe may be caused by any medication additions or changes?: No (7/2/2024  2:42 PM)  Does the patient have all medications ordered at discharge?: Yes (7/2/2024  2:42 PM)  Care Management Interventions: No intervention needed (7/2/2024  2:42 PM)  Prescription Comments: pt sent home with script (7/2/2024  2:42 PM)  Is the patient taking all medications as directed (includes completed medication regime)?: Yes (7/2/2024  2:42 PM)  Care Management Interventions: Provided patient education (7/2/2024  2:42 PM)  Medication Comments: Pt denies problems obtaining or affording medication (7/2/2024  2:42 PM)    Appointments  Does the patient have a primary care provider?: Yes (No available appts. Task to office) (7/2/2024  2:42 PM)  Care Management Interventions: Verified appointment date/time/provider (7/2/2024  2:42 PM)  Has the patient kept scheduled appointments due by today?: Yes (7/2/2024  2:42 PM)    Self Management  What is the home health agency?: n/a (7/2/2024  2:42 PM)  Has home health visited the patient within 72 hours of discharge?: Not applicable (7/2/2024  2:42 PM)    Patient Teaching  Does the patient have access to their discharge instructions?: Yes (7/2/2024  2:42 PM)  Care Management Interventions: Reviewed instructions with patient (7/2/2024  2:42 PM)  What is the patient's perception of their health status since discharge?: Improving (7/2/2024  2:42 PM)  Is the patient/caregiver able  to teach back the hierarchy of who to call/visit for symptoms/problems? PCP, Specialist, Home Health nurse, Urgent Care, ED, 911: Yes (7/2/2024  2:42 PM)    Wrap Up  Wrap Up Additional Comments: This CM spoke with pt via phone. Pt reports doing well at home since discharge. New meds reviewed. Pt denies CP and SOB. Pt aware of my availability for non-emergent concerns. Contact info provided to patient (7/2/2024  2:42 PM)      Richmond Andrade LPN

## 2024-07-02 NOTE — TELEPHONE ENCOUNTER
----- Message from Richmond Andrade LPN sent at 7/2/2024  2:51 PM EDT -----    Can you please contact pt to schedule hosp  follow up within 14 days of discharge.    Discharge Facility: [facility]  Discharge Diagnosis: Mild non-obstructive CAD, possible GERD   Admission Date: 7/1/24  Discharge Date: 7/1/24        Thank you     Richmond Andrade LPN

## 2024-07-03 ENCOUNTER — TELEPHONE (OUTPATIENT)
Dept: PRIMARY CARE | Facility: CLINIC | Age: 54
End: 2024-07-03
Payer: COMMERCIAL

## 2024-07-03 NOTE — TELEPHONE ENCOUNTER
----- Message from Richmond Andrade LPN sent at 7/2/2024  2:51 PM EDT -----    Can you please contact pt to schedule hosp  follow up within 14 days of discharge.    Discharge Facility: [facility]  Discharge Diagnosis: Mild non-obstructive CAD, possible GERD   Admission Date: 7/1/24  Discharge Date: 7/1/24        Thank you     Richmond Andrade LPN

## 2024-07-05 ENCOUNTER — TELEPHONE (OUTPATIENT)
Dept: PRIMARY CARE | Facility: CLINIC | Age: 54
End: 2024-07-05
Payer: COMMERCIAL

## 2024-07-05 NOTE — TELEPHONE ENCOUNTER
Transition of Care    Inpatient facility: [facility]  Discharge diagnosis: MILD NON-OBSTRUCTIVE CAD, POSSIBLE GERD   Discharged to: HOME   Discharge date: 07/01/2024  Initial Call date: 07/03/2024  Spoke with patient/caregiver: PATENT                                                                      Do you need assistance  visits prior to your PCP visit: No  Home health care ordered: No  Have you been contacted by home care and have a start of care date: No  Are you taking medications as prescribed at discharge: Yes    Referral to [facility] Pharmacist: No  Patient advised to bring all medications to PCP follow-up appointment.  Patient advised to follow discharge instructions until provider follow-up.  TCM visit date: 07/10/2024 AT 8:30   TCM provider visit with: JOB MENDOZA PA-C

## 2024-07-08 LAB
ATRIAL RATE: 67 BPM
P AXIS: 60 DEGREES
P OFFSET: 179 MS
P ONSET: 124 MS
PR INTERVAL: 194 MS
Q ONSET: 221 MS
QRS COUNT: 11 BEATS
QRS DURATION: 70 MS
QT INTERVAL: 404 MS
QTC CALCULATION(BAZETT): 426 MS
QTC FREDERICIA: 419 MS
R AXIS: 79 DEGREES
T AXIS: 58 DEGREES
T OFFSET: 423 MS
VENTRICULAR RATE: 67 BPM

## 2024-07-09 DIAGNOSIS — E11.9 TYPE 2 DIABETES MELLITUS WITHOUT COMPLICATION, WITHOUT LONG-TERM CURRENT USE OF INSULIN (MULTI): Primary | ICD-10-CM

## 2024-07-10 ENCOUNTER — APPOINTMENT (OUTPATIENT)
Dept: PRIMARY CARE | Facility: CLINIC | Age: 54
End: 2024-07-10
Payer: COMMERCIAL

## 2024-07-10 VITALS
SYSTOLIC BLOOD PRESSURE: 116 MMHG | BODY MASS INDEX: 28.07 KG/M2 | HEART RATE: 81 BPM | OXYGEN SATURATION: 96 % | DIASTOLIC BLOOD PRESSURE: 62 MMHG | TEMPERATURE: 97.3 F | WEIGHT: 158.4 LBS | HEIGHT: 63 IN

## 2024-07-10 DIAGNOSIS — R53.82 CHRONIC FATIGUE AND MALAISE: ICD-10-CM

## 2024-07-10 DIAGNOSIS — I73.9 INTERMITTENT CLAUDICATION (CMS-HCC): ICD-10-CM

## 2024-07-10 DIAGNOSIS — R53.81 CHRONIC FATIGUE AND MALAISE: ICD-10-CM

## 2024-07-10 DIAGNOSIS — I25.10 CORONARY ARTERY DISEASE INVOLVING NATIVE CORONARY ARTERY OF NATIVE HEART, UNSPECIFIED WHETHER ANGINA PRESENT: Primary | ICD-10-CM

## 2024-07-10 LAB
ATRIAL RATE: 73 BPM
P AXIS: 59 DEGREES
P OFFSET: 180 MS
P ONSET: 131 MS
PR INTERVAL: 178 MS
Q ONSET: 220 MS
QRS COUNT: 12 BEATS
QRS DURATION: 74 MS
QT INTERVAL: 378 MS
QTC CALCULATION(BAZETT): 416 MS
QTC FREDERICIA: 403 MS
R AXIS: 75 DEGREES
T AXIS: 54 DEGREES
T OFFSET: 409 MS
VENTRICULAR RATE: 73 BPM

## 2024-07-10 PROCEDURE — 99495 TRANSJ CARE MGMT MOD F2F 14D: CPT | Performed by: PHYSICIAN ASSISTANT

## 2024-07-10 PROCEDURE — 3048F LDL-C <100 MG/DL: CPT | Performed by: PHYSICIAN ASSISTANT

## 2024-07-10 PROCEDURE — 3074F SYST BP LT 130 MM HG: CPT | Performed by: PHYSICIAN ASSISTANT

## 2024-07-10 PROCEDURE — 4010F ACE/ARB THERAPY RXD/TAKEN: CPT | Performed by: PHYSICIAN ASSISTANT

## 2024-07-10 PROCEDURE — 3078F DIAST BP <80 MM HG: CPT | Performed by: PHYSICIAN ASSISTANT

## 2024-07-10 ASSESSMENT — PATIENT HEALTH QUESTIONNAIRE - PHQ9
SUM OF ALL RESPONSES TO PHQ9 QUESTIONS 1 AND 2: 0
2. FEELING DOWN, DEPRESSED OR HOPELESS: NOT AT ALL
2. FEELING DOWN, DEPRESSED OR HOPELESS: NOT AT ALL
SUM OF ALL RESPONSES TO PHQ9 QUESTIONS 1 AND 2: 0
1. LITTLE INTEREST OR PLEASURE IN DOING THINGS: NOT AT ALL
1. LITTLE INTEREST OR PLEASURE IN DOING THINGS: NOT AT ALL

## 2024-07-10 NOTE — H&P (VIEW-ONLY)
"Subjective    HPI    Carmelita Mcdonald is a 53 y.o. year old female patient with presenting to clinic with concern for   Chief Complaint   Patient presents with    Hospital Follow-up     TCM. Is doing okay.     for Transitional Care Management visit after hospitalization    Carmelita Mcdonald is presenting today for follow- up after being discharged from hospitalization 9 days ago, 7/1/24.   The main problem requiring admission was Unstable angina, Mild non-obstructive CAD, possible GERD .   The discharge summary and/or Transitional Care Management documentation was reviewed. Medication reconciliation was performed as indicated via the \"Andrez as Reviewed\" time stamp.     Initially had stress test-   Impression   1. Suspicion of moderate ischemia along the small territory of the  mid anterior wall. Further evaluation and/or treatment would be  warranted.  2. No evidence of prior myocardial infarction.  3. The left ventricle is normal in size.  4. Normal LV wall motion with a post-stress LV EF estimated at  greater than 65%.     EKG- Normal sinus rhythm  Possible Left atrial enlargement  Borderline ECG  When compared with ECG of 22-MAY-2014 12:27,  No significant change was found      Echo    Presented to ER Chest pain, nausea vs GERD. Negative Troponin x2, Neg ST elevation. She was taken to cath lab with following findings: mild nonobstructive CAD on cath. R wrist cath site.     Dr Aguirre cath    Cardiol follow up Tierra Hunt    Issues Requiring Follow-Up  Started on PPI  - pantoprazole 40mg- it has been helping       discontinue metoprolol 25 (STOP Lopressor)  Start low dose norvasc-- amlodipine 2.5mg  Continue ASA/Statin  Encouraged to stop smoking    DM metformin XR 600mg (2 tablets 100mg) bid  Statin- atorvastatin 80mg at bedtime  ACE I lisinopril 2.5mg    Sees Endocrinology for DM- poorly controlled on Metformin, intolerant of other meds.   Last A1c 1 month ago at 7.8%    Chronic fatigue has been an issue for a couple " of years. Going to [place] to see friends on vacation and wants to feel better.   Noted cramping and pain in legs- alexus Left leg for several months + concern for intermittent claudication    History    Patient Active Problem List   Diagnosis    BPPV (benign paroxysmal positional vertigo)    Anxiety    Allergic rhinitis    Chronic fatigue    DM2 (diabetes mellitus, type 2) (Multi)    Herniated cervical disc    Hiatal hernia    HTN (hypertension)    GERD (gastroesophageal reflux disease)    Cigarette nicotine dependence without complication    Stage 1 chronic kidney disease    Vitamin D deficiency    Hyperlipidemia    Unstable angina (Multi)    Chest pain    Coronary artery disease involving native coronary artery of native heart    Intermittent claudication (CMS-HCC)    Chronic fatigue and malaise       Past Medical History:   Diagnosis Date    Cervical radiculopathy, acute 07/11/2023    Depression     History of tonsillectomy 07/11/2023    Hyperlipidemia     Hypertension     Nontraumatic tear of right rotator cuff 07/11/2023    Peripheral neuropathy     Rotator cuff tear 07/11/2023    Type 2 diabetes mellitus (Multi)       Past Surgical History:   Procedure Laterality Date    CARDIAC CATHETERIZATION N/A 7/1/2024    Procedure: Left Heart Cath;  Surgeon: Richmond Aguirre MD;  Location: [facility] Cardiac Cath Lab;  Service: Cardiovascular;  Laterality: N/A;    HYSTERECTOMY  06/30/2020    Hysterectomy    SPINE SURGERY        Family History   Problem Relation Name Age of Onset    Diabetes Mother      Hypertension Mother      Hyperlipidemia Mother      Breast cancer Mother  54    Diabetes Father      Hypertension Father        Social History     Tobacco Use    Smoking status: Every Day     Current packs/day: 1.00     Average packs/day: 1 pack/day for 38.5 years (38.5 ttl pk-yrs)     Types: Cigarettes     Start date: 1986    Smokeless tobacco: Never    Tobacco comments:     Shared decision making: Discussed LDCT. Interested in  lung cancer screening and pursuing treatment options if abnormality found. Patient is in agreement with this   Substance Use Topics    Alcohol use: Yes     Comment: social          Current Outpatient Medications:     amLODIPine (Norvasc) 2.5 mg tablet, Take 1 tablet (2.5 mg) by mouth once daily., Disp: 30 tablet, Rfl: 0    aspirin 81 mg chewable tablet, Chew 1 tablet (81 mg) once daily., Disp: 30 tablet, Rfl: 11    atorvastatin (Lipitor) 80 mg tablet, Take 1 tablet (80 mg) by mouth once daily., Disp: 30 tablet, Rfl: 11    cholecalciferol (Vitamin D-3) 25 MCG (1000 UT) capsule, Take 1 capsule (25 mcg) by mouth once daily., Disp: , Rfl:     cyanocobalamin, vitamin B-12, (Vitamin B-12) 1,000 mcg tablet extended release, Take 1 tablet (1,000 mcg) by mouth once daily., Disp: , Rfl:     DULoxetine (Cymbalta) 30 mg DR capsule, Take 1 capsule (30 mg) by mouth once daily. Do not crush or chew., Disp: 90 capsule, Rfl: 3    lactobacillus acidophilus & bulgar (Lactinex) 1 million cell chewable tablet, Chew 1 tablet once daily., Disp: , Rfl:     lisinopril 2.5 mg tablet, Take 1 tablet (2.5 mg) by mouth once daily., Disp: 90 tablet, Rfl: 3    meloxicam (Mobic) 15 mg tablet, Take 1 tablet (15 mg) by mouth once daily as needed for moderate pain (4 - 6)., Disp: 90 tablet, Rfl: 1    metFORMIN  mg 24 hr tablet, Take 2 tablets (1,000 mg) by mouth 2 times a day. Do not start taking this again until July 4, 2024 Do not fill before July 4, 2024., Disp: , Rfl:     OneTouch Verio Flex meter misc, Use as directed, Disp: , Rfl:     OneTouch Verio test strips strip, Use as instructed to check blood sugar 3 times daily. E11.65, Disp: , Rfl:     pantoprazole (ProtoNix) 40 mg EC tablet, Take 1 tablet (40 mg) by mouth once daily in the morning. Take before meals. Do not crush, chew, or split., Disp: 30 tablet, Rfl: 0     Review of Systems    Constitutional: Denies fever  HEENT: Denies ST, earache  CVS: Denies Chest pain  Pulmonary: Denies  "wheezing, SOB  GI: Denies N/V  : Denies dysuria  Musculoskeletal:  Denies myalgia  Neuro: Denies focal weakness or numbness.  Skin: Denies Rashes.  *Review of Systems is negative unless otherwise mentioned in HPI or ROS above.    Objective    Vitals:    07/10/24 0820   BP: 116/62   Pulse: 81   Temp: 36.3 °C (97.3 °F)   SpO2: 96%        Exam    /62   Pulse 81   Temp 36.3 °C (97.3 °F)   Ht 1.6 m (5' 3\")   Wt 71.8 kg (158 lb 6.4 oz)   SpO2 96%   BMI 28.06 kg/m²  reviewed   Body mass index is 28.06 kg/m².   Constitutional: NAD.  Resting comfortably.  Head: Atraumatic, normocephalic.  ENT: moist oral mucosa.  Cardiac: Regular rate & rhythm.   Pulmonary: Lungs clear bilat  GI: Soft, Nontender, nondistended.   Musculoskeletal: No peripheral edema. Lower legs hairless No ulcers.  Skin: No evidence of trauma. No rashes  Psych: Intact judgement and insight.      Plan    Current Outpatient Medications:     amLODIPine (Norvasc) 2.5 mg tablet, Take 1 tablet (2.5 mg) by mouth once daily., Disp: 30 tablet, Rfl: 0    aspirin 81 mg chewable tablet, Chew 1 tablet (81 mg) once daily., Disp: 30 tablet, Rfl: 11    atorvastatin (Lipitor) 80 mg tablet, Take 1 tablet (80 mg) by mouth once daily., Disp: 30 tablet, Rfl: 11    cholecalciferol (Vitamin D-3) 25 MCG (1000 UT) capsule, Take 1 capsule (25 mcg) by mouth once daily., Disp: , Rfl:     cyanocobalamin, vitamin B-12, (Vitamin B-12) 1,000 mcg tablet extended release, Take 1 tablet (1,000 mcg) by mouth once daily., Disp: , Rfl:     DULoxetine (Cymbalta) 30 mg DR capsule, Take 1 capsule (30 mg) by mouth once daily. Do not crush or chew., Disp: 90 capsule, Rfl: 3    lactobacillus acidophilus & bulgar (Lactinex) 1 million cell chewable tablet, Chew 1 tablet once daily., Disp: , Rfl:     lisinopril 2.5 mg tablet, Take 1 tablet (2.5 mg) by mouth once daily., Disp: 90 tablet, Rfl: 3    meloxicam (Mobic) 15 mg tablet, Take 1 tablet (15 mg) by mouth once daily as needed for moderate " pain (4 - 6)., Disp: 90 tablet, Rfl: 1    metFORMIN  mg 24 hr tablet, Take 2 tablets (1,000 mg) by mouth 2 times a day. Do not start taking this again until July 4, 2024 Do not fill before July 4, 2024., Disp: , Rfl:     OneTouch Verio Flex meter misc, Use as directed, Disp: , Rfl:     OneTouch Verio test strips strip, Use as instructed to check blood sugar 3 times daily. E11.65, Disp: , Rfl:     pantoprazole (ProtoNix) 40 mg EC tablet, Take 1 tablet (40 mg) by mouth once daily in the morning. Take before meals. Do not crush, chew, or split., Disp: 30 tablet, Rfl: 0       MDM    Assessment    Problem List Items Addressed This Visit       Coronary artery disease involving native coronary artery of native heart - Primary    Intermittent claudication (CMS-HCC)    Relevant Orders    Vascular US Lower Extremity Arterial Duplex Bilateral With HAYDEE    Chronic fatigue and malaise    Relevant Orders    Vitamin B12    MELITON + GENNY Panel    Iron and TIBC    Magnesium

## 2024-07-10 NOTE — PROGRESS NOTES
"Subjective    HPI    Carmelita Mcdonald is a 53 y.o. year old female patient with presenting to clinic with concern for   Chief Complaint   Patient presents with    Hospital Follow-up     TCM. Is doing okay.     for Transitional Care Management visit after hospitalization    Carmelita Mcdonald is presenting today for follow- up after being discharged from hospitalization 9 days ago, 7/1/24.   The main problem requiring admission was Unstable angina, Mild non-obstructive CAD, possible GERD .   The discharge summary and/or Transitional Care Management documentation was reviewed. Medication reconciliation was performed as indicated via the \"Andrez as Reviewed\" time stamp.     Initially had stress test-   Impression   1. Suspicion of moderate ischemia along the small territory of the  mid anterior wall. Further evaluation and/or treatment would be  warranted.  2. No evidence of prior myocardial infarction.  3. The left ventricle is normal in size.  4. Normal LV wall motion with a post-stress LV EF estimated at  greater than 65%.     EKG- Normal sinus rhythm  Possible Left atrial enlargement  Borderline ECG  When compared with ECG of 22-MAY-2014 12:27,  No significant change was found      Echo    Presented to ER Chest pain, nausea vs GERD. Negative Troponin x2, Neg ST elevation. She was taken to cath lab with following findings: mild nonobstructive CAD on cath. R wrist cath site.     Dr Aguirre cath    Cardiol follow up Tierra Hunt    Issues Requiring Follow-Up  Started on PPI  - pantoprazole 40mg- it has been helping       discontinue metoprolol 25 (STOP Lopressor)  Start low dose norvasc-- amlodipine 2.5mg  Continue ASA/Statin  Encouraged to stop smoking    DM metformin XR 600mg (2 tablets 100mg) bid  Statin- atorvastatin 80mg at bedtime  ACE I lisinopril 2.5mg    Sees Endocrinology for DM- poorly controlled on Metformin, intolerant of other meds.   Last A1c 1 month ago at 7.8%    Chronic fatigue has been an issue for a couple " of years. Going to [place] to see friends on vacation and wants to feel better.   Noted cramping and pain in legs- alexus Left leg for several months + concern for intermittent claudication    History    Patient Active Problem List   Diagnosis    BPPV (benign paroxysmal positional vertigo)    Anxiety    Allergic rhinitis    Chronic fatigue    DM2 (diabetes mellitus, type 2) (Multi)    Herniated cervical disc    Hiatal hernia    HTN (hypertension)    GERD (gastroesophageal reflux disease)    Cigarette nicotine dependence without complication    Stage 1 chronic kidney disease    Vitamin D deficiency    Hyperlipidemia    Unstable angina (Multi)    Chest pain    Coronary artery disease involving native coronary artery of native heart    Intermittent claudication (CMS-HCC)    Chronic fatigue and malaise       Past Medical History:   Diagnosis Date    Cervical radiculopathy, acute 07/11/2023    Depression     History of tonsillectomy 07/11/2023    Hyperlipidemia     Hypertension     Nontraumatic tear of right rotator cuff 07/11/2023    Peripheral neuropathy     Rotator cuff tear 07/11/2023    Type 2 diabetes mellitus (Multi)       Past Surgical History:   Procedure Laterality Date    CARDIAC CATHETERIZATION N/A 7/1/2024    Procedure: Left Heart Cath;  Surgeon: Richmond Aguirre MD;  Location: [facility] Cardiac Cath Lab;  Service: Cardiovascular;  Laterality: N/A;    HYSTERECTOMY  06/30/2020    Hysterectomy    SPINE SURGERY        Family History   Problem Relation Name Age of Onset    Diabetes Mother      Hypertension Mother      Hyperlipidemia Mother      Breast cancer Mother  54    Diabetes Father      Hypertension Father        Social History     Tobacco Use    Smoking status: Every Day     Current packs/day: 1.00     Average packs/day: 1 pack/day for 38.5 years (38.5 ttl pk-yrs)     Types: Cigarettes     Start date: 1986    Smokeless tobacco: Never    Tobacco comments:     Shared decision making: Discussed LDCT. Interested in  lung cancer screening and pursuing treatment options if abnormality found. Patient is in agreement with this   Substance Use Topics    Alcohol use: Yes     Comment: social          Current Outpatient Medications:     amLODIPine (Norvasc) 2.5 mg tablet, Take 1 tablet (2.5 mg) by mouth once daily., Disp: 30 tablet, Rfl: 0    aspirin 81 mg chewable tablet, Chew 1 tablet (81 mg) once daily., Disp: 30 tablet, Rfl: 11    atorvastatin (Lipitor) 80 mg tablet, Take 1 tablet (80 mg) by mouth once daily., Disp: 30 tablet, Rfl: 11    cholecalciferol (Vitamin D-3) 25 MCG (1000 UT) capsule, Take 1 capsule (25 mcg) by mouth once daily., Disp: , Rfl:     cyanocobalamin, vitamin B-12, (Vitamin B-12) 1,000 mcg tablet extended release, Take 1 tablet (1,000 mcg) by mouth once daily., Disp: , Rfl:     DULoxetine (Cymbalta) 30 mg DR capsule, Take 1 capsule (30 mg) by mouth once daily. Do not crush or chew., Disp: 90 capsule, Rfl: 3    lactobacillus acidophilus & bulgar (Lactinex) 1 million cell chewable tablet, Chew 1 tablet once daily., Disp: , Rfl:     lisinopril 2.5 mg tablet, Take 1 tablet (2.5 mg) by mouth once daily., Disp: 90 tablet, Rfl: 3    meloxicam (Mobic) 15 mg tablet, Take 1 tablet (15 mg) by mouth once daily as needed for moderate pain (4 - 6)., Disp: 90 tablet, Rfl: 1    metFORMIN  mg 24 hr tablet, Take 2 tablets (1,000 mg) by mouth 2 times a day. Do not start taking this again until July 4, 2024 Do not fill before July 4, 2024., Disp: , Rfl:     OneTouch Verio Flex meter misc, Use as directed, Disp: , Rfl:     OneTouch Verio test strips strip, Use as instructed to check blood sugar 3 times daily. E11.65, Disp: , Rfl:     pantoprazole (ProtoNix) 40 mg EC tablet, Take 1 tablet (40 mg) by mouth once daily in the morning. Take before meals. Do not crush, chew, or split., Disp: 30 tablet, Rfl: 0     Review of Systems    Constitutional: Denies fever  HEENT: Denies ST, earache  CVS: Denies Chest pain  Pulmonary: Denies  "wheezing, SOB  GI: Denies N/V  : Denies dysuria  Musculoskeletal:  Denies myalgia  Neuro: Denies focal weakness or numbness.  Skin: Denies Rashes.  *Review of Systems is negative unless otherwise mentioned in HPI or ROS above.    Objective    Vitals:    07/10/24 0820   BP: 116/62   Pulse: 81   Temp: 36.3 °C (97.3 °F)   SpO2: 96%        Exam    /62   Pulse 81   Temp 36.3 °C (97.3 °F)   Ht 1.6 m (5' 3\")   Wt 71.8 kg (158 lb 6.4 oz)   SpO2 96%   BMI 28.06 kg/m²  reviewed   Body mass index is 28.06 kg/m².   Constitutional: NAD.  Resting comfortably.  Head: Atraumatic, normocephalic.  ENT: moist oral mucosa.  Cardiac: Regular rate & rhythm.   Pulmonary: Lungs clear bilat  GI: Soft, Nontender, nondistended.   Musculoskeletal: No peripheral edema. Lower legs hairless No ulcers.  Skin: No evidence of trauma. No rashes  Psych: Intact judgement and insight.      Plan    Current Outpatient Medications:     amLODIPine (Norvasc) 2.5 mg tablet, Take 1 tablet (2.5 mg) by mouth once daily., Disp: 30 tablet, Rfl: 0    aspirin 81 mg chewable tablet, Chew 1 tablet (81 mg) once daily., Disp: 30 tablet, Rfl: 11    atorvastatin (Lipitor) 80 mg tablet, Take 1 tablet (80 mg) by mouth once daily., Disp: 30 tablet, Rfl: 11    cholecalciferol (Vitamin D-3) 25 MCG (1000 UT) capsule, Take 1 capsule (25 mcg) by mouth once daily., Disp: , Rfl:     cyanocobalamin, vitamin B-12, (Vitamin B-12) 1,000 mcg tablet extended release, Take 1 tablet (1,000 mcg) by mouth once daily., Disp: , Rfl:     DULoxetine (Cymbalta) 30 mg DR capsule, Take 1 capsule (30 mg) by mouth once daily. Do not crush or chew., Disp: 90 capsule, Rfl: 3    lactobacillus acidophilus & bulgar (Lactinex) 1 million cell chewable tablet, Chew 1 tablet once daily., Disp: , Rfl:     lisinopril 2.5 mg tablet, Take 1 tablet (2.5 mg) by mouth once daily., Disp: 90 tablet, Rfl: 3    meloxicam (Mobic) 15 mg tablet, Take 1 tablet (15 mg) by mouth once daily as needed for moderate " pain (4 - 6)., Disp: 90 tablet, Rfl: 1    metFORMIN  mg 24 hr tablet, Take 2 tablets (1,000 mg) by mouth 2 times a day. Do not start taking this again until July 4, 2024 Do not fill before July 4, 2024., Disp: , Rfl:     OneTouch Verio Flex meter misc, Use as directed, Disp: , Rfl:     OneTouch Verio test strips strip, Use as instructed to check blood sugar 3 times daily. E11.65, Disp: , Rfl:     pantoprazole (ProtoNix) 40 mg EC tablet, Take 1 tablet (40 mg) by mouth once daily in the morning. Take before meals. Do not crush, chew, or split., Disp: 30 tablet, Rfl: 0       MDM    Assessment    Problem List Items Addressed This Visit       Coronary artery disease involving native coronary artery of native heart - Primary    Intermittent claudication (CMS-HCC)    Relevant Orders    Vascular US Lower Extremity Arterial Duplex Bilateral With HAYDEE    Chronic fatigue and malaise    Relevant Orders    Vitamin B12    EMLITON + GENNY Panel    Iron and TIBC    Magnesium

## 2024-07-11 ENCOUNTER — LAB (OUTPATIENT)
Dept: LAB | Facility: LAB | Age: 54
End: 2024-07-11
Payer: COMMERCIAL

## 2024-07-11 DIAGNOSIS — R94.39 POSITIVE CARDIAC STRESS TEST: ICD-10-CM

## 2024-07-11 DIAGNOSIS — R53.82 CHRONIC FATIGUE AND MALAISE: ICD-10-CM

## 2024-07-11 DIAGNOSIS — R53.81 CHRONIC FATIGUE AND MALAISE: ICD-10-CM

## 2024-07-11 LAB
ANION GAP SERPL CALC-SCNC: 16 MMOL/L (ref 10–20)
BUN SERPL-MCNC: 22 MG/DL (ref 6–23)
CALCIUM SERPL-MCNC: 9 MG/DL (ref 8.6–10.3)
CHLORIDE SERPL-SCNC: 100 MMOL/L (ref 98–107)
CO2 SERPL-SCNC: 23 MMOL/L (ref 21–32)
CREAT SERPL-MCNC: 0.89 MG/DL (ref 0.5–1.05)
EGFRCR SERPLBLD CKD-EPI 2021: 77 ML/MIN/1.73M*2
ERYTHROCYTE [DISTWIDTH] IN BLOOD BY AUTOMATED COUNT: 13.1 % (ref 11.5–14.5)
GLUCOSE SERPL-MCNC: 176 MG/DL (ref 74–99)
HCT VFR BLD AUTO: 46.6 % (ref 36–46)
HGB BLD-MCNC: 16 G/DL (ref 12–16)
IRON SATN MFR SERPL: 29 % (ref 25–45)
IRON SERPL-MCNC: 97 UG/DL (ref 35–150)
MAGNESIUM SERPL-MCNC: 1.53 MG/DL (ref 1.6–2.4)
MCH RBC QN AUTO: 30.5 PG (ref 26–34)
MCHC RBC AUTO-ENTMCNC: 34.3 G/DL (ref 32–36)
MCV RBC AUTO: 89 FL (ref 80–100)
NRBC BLD-RTO: 0 /100 WBCS (ref 0–0)
PLATELET # BLD AUTO: 215 X10*3/UL (ref 150–450)
POTASSIUM SERPL-SCNC: 4.1 MMOL/L (ref 3.5–5.3)
RBC # BLD AUTO: 5.24 X10*6/UL (ref 4–5.2)
SODIUM SERPL-SCNC: 135 MMOL/L (ref 136–145)
TIBC SERPL-MCNC: 337 UG/DL (ref 240–445)
UIBC SERPL-MCNC: 240 UG/DL (ref 110–370)
WBC # BLD AUTO: 11.8 X10*3/UL (ref 4.4–11.3)

## 2024-07-11 PROCEDURE — 36415 COLL VENOUS BLD VENIPUNCTURE: CPT

## 2024-07-11 PROCEDURE — 83735 ASSAY OF MAGNESIUM: CPT

## 2024-07-11 PROCEDURE — 86235 NUCLEAR ANTIGEN ANTIBODY: CPT

## 2024-07-11 PROCEDURE — 80048 BASIC METABOLIC PNL TOTAL CA: CPT

## 2024-07-11 PROCEDURE — 83550 IRON BINDING TEST: CPT

## 2024-07-11 PROCEDURE — 86225 DNA ANTIBODY NATIVE: CPT

## 2024-07-11 PROCEDURE — 83540 ASSAY OF IRON: CPT

## 2024-07-11 PROCEDURE — 82607 VITAMIN B-12: CPT

## 2024-07-11 PROCEDURE — 86038 ANTINUCLEAR ANTIBODIES: CPT

## 2024-07-11 PROCEDURE — 85027 COMPLETE CBC AUTOMATED: CPT

## 2024-07-12 LAB — VIT B12 SERPL-MCNC: >2000 PG/ML (ref 211–911)

## 2024-07-15 DIAGNOSIS — G43.719 INTRACTABLE CHRONIC MIGRAINE WITHOUT AURA AND WITHOUT STATUS MIGRAINOSUS: ICD-10-CM

## 2024-07-15 DIAGNOSIS — E67.8 EXCESSIVE VITAMIN B12 INTAKE: Primary | ICD-10-CM

## 2024-07-15 LAB
ANA SER QL HEP2 SUBST: NEGATIVE
CENTROMERE B AB SER-ACNC: <0.2 AI
CHROMATIN AB SERPL-ACNC: <0.2 AI
DSDNA AB SER-ACNC: 3 IU/ML
ENA JO1 AB SER QL IA: <0.2 AI
ENA RNP AB SER IA-ACNC: <0.2 AI
ENA SCL70 AB SER QL IA: <0.2 AI
ENA SM AB SER IA-ACNC: <0.2 AI
ENA SM+RNP AB SER QL IA: <0.2 AI
ENA SS-A AB SER IA-ACNC: <0.2 AI
ENA SS-B AB SER IA-ACNC: <0.2 AI
RIBOSOMAL P AB SER-ACNC: <0.2 AI

## 2024-07-18 ENCOUNTER — ANESTHESIA (OUTPATIENT)
Dept: GASTROENTEROLOGY | Facility: HOSPITAL | Age: 54
End: 2024-07-18
Payer: COMMERCIAL

## 2024-07-18 ENCOUNTER — HOSPITAL ENCOUNTER (OUTPATIENT)
Dept: GASTROENTEROLOGY | Facility: HOSPITAL | Age: 54
Discharge: HOME | End: 2024-07-18
Payer: COMMERCIAL

## 2024-07-18 ENCOUNTER — ANESTHESIA EVENT (OUTPATIENT)
Dept: GASTROENTEROLOGY | Facility: HOSPITAL | Age: 54
End: 2024-07-18
Payer: COMMERCIAL

## 2024-07-18 ENCOUNTER — PATIENT OUTREACH (OUTPATIENT)
Dept: PRIMARY CARE | Facility: CLINIC | Age: 54
End: 2024-07-18

## 2024-07-18 VITALS
OXYGEN SATURATION: 96 % | TEMPERATURE: 97 F | DIASTOLIC BLOOD PRESSURE: 66 MMHG | HEART RATE: 82 BPM | SYSTOLIC BLOOD PRESSURE: 119 MMHG | BODY MASS INDEX: 28 KG/M2 | WEIGHT: 158 LBS | HEIGHT: 63 IN | RESPIRATION RATE: 16 BRPM

## 2024-07-18 DIAGNOSIS — R19.5 POSITIVE COLORECTAL CANCER SCREENING USING COLOGUARD TEST: ICD-10-CM

## 2024-07-18 DIAGNOSIS — Z12.11 SCREENING FOR COLON CANCER: ICD-10-CM

## 2024-07-18 PROCEDURE — 2720000007 HC OR 272 NO HCPCS

## 2024-07-18 PROCEDURE — 2500000004 HC RX 250 GENERAL PHARMACY W/ HCPCS (ALT 636 FOR OP/ED)

## 2024-07-18 PROCEDURE — 3700000002 HC GENERAL ANESTHESIA TIME - EACH INCREMENTAL 1 MINUTE

## 2024-07-18 PROCEDURE — 45385 COLONOSCOPY W/LESION REMOVAL: CPT | Performed by: SURGERY

## 2024-07-18 PROCEDURE — 7100000009 HC PHASE TWO TIME - INITIAL BASE CHARGE

## 2024-07-18 PROCEDURE — 3700000001 HC GENERAL ANESTHESIA TIME - INITIAL BASE CHARGE

## 2024-07-18 PROCEDURE — 7100000010 HC PHASE TWO TIME - EACH INCREMENTAL 1 MINUTE

## 2024-07-18 PROCEDURE — 2500000001 HC RX 250 WO HCPCS SELF ADMINISTERED DRUGS (ALT 637 FOR MEDICARE OP): Performed by: SURGERY

## 2024-07-18 PROCEDURE — 2500000004 HC RX 250 GENERAL PHARMACY W/ HCPCS (ALT 636 FOR OP/ED): Performed by: NURSE ANESTHETIST, CERTIFIED REGISTERED

## 2024-07-18 RX ORDER — PROPOFOL 10 MG/ML
INJECTION, EMULSION INTRAVENOUS AS NEEDED
Status: DISCONTINUED | OUTPATIENT
Start: 2024-07-18 | End: 2024-07-18

## 2024-07-18 RX ORDER — DEXTROMETHORPHAN/PSEUDOEPHED 2.5-7.5/.8
DROPS ORAL AS NEEDED
Status: COMPLETED | OUTPATIENT
Start: 2024-07-18 | End: 2024-07-18

## 2024-07-18 RX ORDER — SODIUM CHLORIDE, SODIUM LACTATE, POTASSIUM CHLORIDE, CALCIUM CHLORIDE 600; 310; 30; 20 MG/100ML; MG/100ML; MG/100ML; MG/100ML
20 INJECTION, SOLUTION INTRAVENOUS CONTINUOUS
Status: DISCONTINUED | OUTPATIENT
Start: 2024-07-18 | End: 2024-07-19 | Stop reason: HOSPADM

## 2024-07-18 SDOH — HEALTH STABILITY: MENTAL HEALTH: CURRENT SMOKER: 1

## 2024-07-18 ASSESSMENT — PAIN SCALES - GENERAL
PAINLEVEL_OUTOF10: 0 - NO PAIN
PAIN_LEVEL: 0
PAINLEVEL_OUTOF10: 0 - NO PAIN

## 2024-07-18 ASSESSMENT — PAIN - FUNCTIONAL ASSESSMENT
PAIN_FUNCTIONAL_ASSESSMENT: 0-10

## 2024-07-18 ASSESSMENT — COLUMBIA-SUICIDE SEVERITY RATING SCALE - C-SSRS
6. HAVE YOU EVER DONE ANYTHING, STARTED TO DO ANYTHING, OR PREPARED TO DO ANYTHING TO END YOUR LIFE?: NO
2. HAVE YOU ACTUALLY HAD ANY THOUGHTS OF KILLING YOURSELF?: NO
1. IN THE PAST MONTH, HAVE YOU WISHED YOU WERE DEAD OR WISHED YOU COULD GO TO SLEEP AND NOT WAKE UP?: NO

## 2024-07-18 NOTE — PROGRESS NOTES
Unable to reach patient for call back after patient's follow up appointment with PCP.   LVM with call back number for patient to call if needed   If no voicemail available call attempts x 2 were made to contact the patient to assist with any questions or concerns patient may have.    Richmond Andrade LPN

## 2024-07-18 NOTE — ANESTHESIA PREPROCEDURE EVALUATION
Patient: Carmelita Mcdonald    Procedure Information       Date/Time: 07/18/24 0840    Scheduled providers: Salvatore Arnold MD    Procedure: COLONOSCOPY    Location: [facility]            Relevant Problems   Cardiac   (+) Chest pain   (+) Coronary artery disease involving native coronary artery of native heart   (+) HTN (hypertension)   (+) Hyperlipidemia   (+) Unstable angina (Multi)      Neuro   (+) Anxiety      GI   (+) GERD (gastroesophageal reflux disease)   (+) Hiatal hernia      Endocrine   (+) DM2 (diabetes mellitus, type 2) (Multi)       Clinical information reviewed:   Tobacco  Allergies  Meds   Med Hx  Surg Hx  OB Status  Fam Hx  Soc   Hx        NPO Detail:  NPO/Void Status  Carbohydrate Drink Given Prior to Surgery? : N  Date of Last Liquid: 07/18/24  Time of Last Liquid: 0700  Date of Last Solid: 07/17/24  Time of Last Solid: 1000  Last Intake Type: Clear fluids         Physical Exam    Airway  Mallampati: II     Cardiovascular - normal exam     Dental    Pulmonary   (+) decreased breath sounds     Abdominal - normal exam             Anesthesia Plan    History of general anesthesia?: yes  History of complications of general anesthesia?: no    ASA 3     MAC     The patient is a current smoker.  Patient was previously instructed to abstain from smoking on day of procedure.  Patient did not smoke on day of procedure.    intravenous induction   Anesthetic plan and risks discussed with patient and spouse.

## 2024-07-18 NOTE — INTERVAL H&P NOTE
H&P reviewed. The patient was examined and there are no changes to the H&P.    Positive Cologuard. Has a previous colonoscopy more than 10 years ago at a [place] location. Was having issues with BRBPR and they found hemorrhoids. No polyps at that time. She has not had any BRBPR recently and no change in bowel habits. She has no family history of CRC or IBD. No abdominal surgeries. Is a chronic smoker, last smoked this morning at 6:30. Stopped baby aspirin on Friday. Tolerated prep well. Some nausea at first but none this morning. Otherwise doing well. Denies fever, chills, SOB, CP, n/v/d.

## 2024-07-18 NOTE — DISCHARGE INSTRUCTIONS
Patient Instructions after a Colonoscopy      The anesthetics, sedatives or narcotics which were given to you today will be acting in your body for the next 24 hours, so you might feel a little sleepy or groggy.  This feeling should slowly wear off. Carefully read and follow the instructions.     You received sedation today:  - Do not drive or operate any machinery or power tools of any kind.   - No alcoholic beverages today, not even beer or wine.  - Do not make any important decisions or sign any legal documents.  - No over the counter medications that contain alcohol or that may cause drowsiness.  - Do not make any important decisions or sign any legal documents.  - Make sure you have someone with you for first 24 hours.    While it is common to experience mild to moderate abdominal distention, gas, or belching after your procedure, if any of these symptoms occur following discharge from the GI Lab or within one week of having your procedure, call the [facility] to be advised whether a visit to your nearest Urgent Care or Emergency Department is indicated.  Take this paper with you if you go.     - If you develop an allergic reaction to the medications that were given during your procedure such as difficulty breathing, rash, hives, severe nausea, vomiting or lightheadedness.  - If you experience chest pain, shortness of breath, severe abdominal pain, fevers and chills.  -If you develop signs and symptoms of bleeding such as blood in your spit, if your stools turn black, tarry, or bloody  - If you have not urinated within 8 hours following your procedure.  - If your IV site becomes painful, red, inflamed, or looks infected.    If you received a biopsy/polypectomy/sphincterotomy the following instructions apply below:    __ Do not use Aspirin containing products, non-steroidal medications or anti-coagulants for one week following your procedure. (Examples of these types of medications are: Advil,  Arthrotec, Aleve, Coumadin, Ecotrin, Heparin, Ibuprofen, Indocin, Motrin, Naprosyn, Nuprin, Plavix, Vioxx, and Voltarin, or their generic forms.  This list is not all-inclusive.  Check with your physician or pharmacist before resuming medications.)   __ Eat a soft diet today.  Avoid foods that are poorly digested for the next 24 hours.  These foods would include: nuts, beans, lettuce, red meats, and fried foods. Start with liquids and advance your diet as tolerated, gradually work up to eating solids.   __ Do not have a Barium Study or Enema for one week.    Your physician recommends the additional following instructions:    -You have a contact number available for emergencies. The signs and symptoms of potential delayed complications were discussed with you. You may return to normal activities tomorrow.  -Resume your previous diet.  -Continue your present medications.   -We are waiting for your pathology results.  -Your physician has recommended a repeat colonoscopy (date to be determined after pending pathology results are reviewed) for surveillance based on pathology results.  -The findings and recommendations have been discussed with you.  -The findings and recommendations were discussed with your family.  - Please see Medication Reconciliation Form for new medication/medications prescribed.       If you experience any problems or have any questions following discharge from the GI Lab, please call:        Nurse Signature                                                                        Date___________________                                                                            Patient/Responsible Party Signature                                        Date___________________

## 2024-07-18 NOTE — ANESTHESIA POSTPROCEDURE EVALUATION
Patient: Carmelita Mcdonald    Procedure Summary       Date: 07/18/24 Room / Location: [facility]    Anesthesia Start: 0831 Anesthesia Stop: 0916    Procedure: COLONOSCOPY Diagnosis:       Screening for colon cancer      Positive colorectal cancer screening using Cologuard test    Scheduled Providers: Salvatore Arnold MD Responsible Provider: MICHELLE Peterson    Anesthesia Type: MAC ASA Status: 3            Anesthesia Type: No value filed.    Vitals Value Taken Time   /57 07/18/24 0914   Temp 36.1 °C (97 °F) 07/18/24 0914   Pulse 94 07/18/24 0914   Resp 16 07/18/24 0914   SpO2 93 % 07/18/24 0914       Anesthesia Post Evaluation    Patient location during evaluation: PACU  Patient participation: complete - patient cannot participate  Level of consciousness: sleepy but conscious  Pain score: 0  Pain management: adequate  Airway patency: patent  Cardiovascular status: acceptable  Respiratory status: acceptable  Hydration status: acceptable  Postoperative Nausea and Vomiting: none        There were no known notable events for this encounter.

## 2024-07-25 DIAGNOSIS — I15.9 SECONDARY HYPERTENSION: ICD-10-CM

## 2024-07-25 DIAGNOSIS — K21.9 GASTROESOPHAGEAL REFLUX DISEASE, UNSPECIFIED WHETHER ESOPHAGITIS PRESENT: ICD-10-CM

## 2024-07-25 RX ORDER — PANTOPRAZOLE SODIUM 40 MG/1
40 TABLET, DELAYED RELEASE ORAL
Qty: 30 TABLET | Refills: 11 | Status: SHIPPED | OUTPATIENT
Start: 2024-07-25 | End: 2025-07-25

## 2024-07-25 RX ORDER — AMLODIPINE BESYLATE 2.5 MG/1
2.5 TABLET ORAL DAILY
Qty: 30 TABLET | Refills: 11 | Status: SHIPPED | OUTPATIENT
Start: 2024-07-25 | End: 2025-07-25

## 2024-07-30 ENCOUNTER — PATIENT OUTREACH (OUTPATIENT)
Dept: PRIMARY CARE | Facility: CLINIC | Age: 54
End: 2024-07-30
Payer: COMMERCIAL

## 2024-08-05 LAB
LABORATORY COMMENT REPORT: NORMAL
PATH REPORT.FINAL DX SPEC: NORMAL
PATH REPORT.GROSS SPEC: NORMAL
PATH REPORT.MICROSCOPIC SPEC OTHER STN: NORMAL
PATH REPORT.RELEVANT HX SPEC: NORMAL
PATH REPORT.TOTAL CANCER: NORMAL

## 2024-08-06 ENCOUNTER — APPOINTMENT (OUTPATIENT)
Dept: CARDIOLOGY | Facility: CLINIC | Age: 54
End: 2024-08-06
Payer: COMMERCIAL

## 2024-08-06 DIAGNOSIS — E11.9 TYPE 2 DIABETES MELLITUS WITHOUT COMPLICATION, WITHOUT LONG-TERM CURRENT USE OF INSULIN (MULTI): ICD-10-CM

## 2024-08-06 DIAGNOSIS — I10 PRIMARY HYPERTENSION: ICD-10-CM

## 2024-08-06 DIAGNOSIS — F17.210 CIGARETTE NICOTINE DEPENDENCE WITHOUT COMPLICATION: ICD-10-CM

## 2024-08-06 DIAGNOSIS — E78.2 MIXED HYPERLIPIDEMIA: Primary | ICD-10-CM

## 2024-08-06 DIAGNOSIS — I25.10 CORONARY ARTERY DISEASE INVOLVING NATIVE CORONARY ARTERY OF NATIVE HEART WITHOUT ANGINA PECTORIS: ICD-10-CM

## 2024-08-06 PROCEDURE — 3048F LDL-C <100 MG/DL: CPT | Performed by: NURSE PRACTITIONER

## 2024-08-06 PROCEDURE — 99443 PR PHYS/QHP TELEPHONE EVALUATION 21-30 MIN: CPT | Performed by: NURSE PRACTITIONER

## 2024-08-06 PROCEDURE — 99406 BEHAV CHNG SMOKING 3-10 MIN: CPT | Performed by: NURSE PRACTITIONER

## 2024-08-06 PROCEDURE — 4010F ACE/ARB THERAPY RXD/TAKEN: CPT | Performed by: NURSE PRACTITIONER

## 2024-08-08 PROBLEM — I20.0 UNSTABLE ANGINA (MULTI): Status: RESOLVED | Noted: 2024-07-01 | Resolved: 2024-08-08

## 2024-08-08 NOTE — PROGRESS NOTES
Primary Care Physician: Mery Wall PA-C  Primary Cardiologist:      Date of Visit: 2024  1:00 PM EDT  Location of visit:  [facility]   Type of Visit: New Patient        Chief Complaint   Patient presents with    Hospital Follow-up     F/U after cath        HPI / Summary:   Carmelita Mcdonald is a 53 y.o. female who presents to establish cardiac care    Past medical history significant for  HTN, HLD, T2DM not requiring insulin returns for follow up of chest pain / cardiac cath     Stress testing returned with suggestion of moderate ischemia along the mid anterior wall,  preserved LV systolic function.  A cardiac cath was planned as an outpatient, but in the meantime she developed recurrent chest discomfort associated with shortness of breath and nausea, prompting evaluation in the Emergency Department.    LHC 2024 showed non-obstructive CAD     She feels well without bothersome chest discomfort.  Unfortunately, continues to smoke and is not interested in quitting     12 system review is negative except as noted above       Medical History:   Past Medical History:   Diagnosis Date    Cervical radiculopathy, acute 2023    Depression     History of tonsillectomy 2023    Hyperlipidemia     Hypertension     Nontraumatic tear of right rotator cuff 2023    Peripheral neuropathy     Rotator cuff tear 2023    Type 2 diabetes mellitus (Multi)        Surgical History:   Past Surgical History:   Procedure Laterality Date    CARDIAC CATHETERIZATION N/A 2024    Procedure: Left Heart Cath;  Surgeon: Richmond Aguirre MD;  Location: [facility] Cardiac Cath Lab;  Service: Cardiovascular;  Laterality: N/A;     SECTION, CLASSIC      x2    HYSTERECTOMY  2020    Hysterectomy    SPINE SURGERY         Family History:   Family History   Problem Relation Name Age of Onset    Diabetes Mother      Hypertension Mother      Hyperlipidemia Mother      Breast cancer Mother  54    Diabetes  Father      Hypertension Father         Social History:   Tobacco Use: High Risk (8/6/2024)    Patient History     Smoking Tobacco Use: Every Day     Smokeless Tobacco Use: Never     Passive Exposure: Current             MEDICATIONS:   Current Outpatient Medications   Medication Instructions    amLODIPine (NORVASC) 2.5 mg, oral, Daily    aspirin 81 mg, oral, Daily    atorvastatin (LIPITOR) 80 mg, oral, Daily    cholecalciferol (VITAMIN D-3) 25 mcg, oral, Daily    DULoxetine (CYMBALTA) 30 mg, oral, Daily, Do not crush or chew.    lactobacillus acidophilus & bulgar (Lactinex) 1 million cell chewable tablet 1 tablet, oral, Daily    lisinopril 2.5 mg, oral, Daily    magnesium glycinate 100 mg tablet 2 tablets, oral, Nightly    meloxicam (MOBIC) 15 mg, oral, Daily PRN    metFORMIN XR (GLUCOPHAGE-XR) 1,000 mg, oral, 2 times daily, Do not start taking this again until July 4, 2024    OneTouch Verio Flex meter misc Use as directed    OneTouch Verio test strips strip Use as instructed to check blood sugar 3 times daily. E11.65    pantoprazole (PROTONIX) 40 mg, oral, Daily before breakfast, Do not crush, chew, or split.         IMAGING REVIEWED:  Cardiac catheterization 7/01/2024  CONCLUSIONS:   1. Left main: near separate ostium.   2. LAD: 30% tubular mid-vessel stenosis.   3. LCx: 20% smooth mid-vessel stenosis.   4. RCA: 20% smoorth proximal stenosis.   5. LVEDP 19mmHg, no aortic stenosis on LV-Ao gradient.     NM stress testing 6/28/2024  IMPRESSION:  1. Suspicion of moderate ischemia along the small territory of the  mid anterior wall. Further evaluation and/or treatment would be  warranted.  2. No evidence of prior myocardial infarction.  3. The left ventricle is normal in size.  4. Normal LV wall motion with a post-stress LV EF estimated at  greater than 65%        Echocardiogram 6/28/2024  CONCLUSIONS:   1. The left ventricular systolic function is normal, with a visually estimated ejection fraction of 60-65%.   2.  Spectral Doppler shows an impaired relaxation pattern of left ventricular diastolic filling.   3. There is normal right ventricular global systolic function.   4. There is trace-mild mitral, tricuspid and pulmonic regurgitation.        CT lung screening 3/12/2024  IMPRESSION:  1. Few small bilateral noncalcified pulmonary nodules measuring up to  4 mm, likely benign. Continued screening with low-dose noncontrast  chest CT in 12 months (from current date) is recommended.  2. Estimated coronary artery calcium score is 260*. This patient is  at moderate to high risk for future cardiovascular events such as  myocardial infarction, ischemic stroke, and cardiovascular death. If  this patient is currently not seeing a cardiologist, consider  referral to the [facility] Cardiovascular Prevention Program (  Preventive Cardiology): Place EPIC referral to Cardiology Prevention  Program.  3. Mild apical emphysema.        LABS:  CBC:   Lab Results   Component Value Date    WBC 11.8 (H) 07/11/2024    RBC 5.24 (H) 07/11/2024    HGB 16.0 07/11/2024    HCT 46.6 (H) 07/11/2024    MCV 89 07/11/2024    MCH 30.5 07/11/2024    MCHC 34.3 07/11/2024    RDW 13.1 07/11/2024     07/11/2024     CBC with Differential:    Lab Results   Component Value Date    WBC 11.8 (H) 07/11/2024    RBC 5.24 (H) 07/11/2024    HGB 16.0 07/11/2024    HCT 46.6 (H) 07/11/2024     07/11/2024    MCV 89 07/11/2024    MCH 30.5 07/11/2024    MCHC 34.3 07/11/2024    RDW 13.1 07/11/2024    NRBC 0.0 07/11/2024    LYMPHOPCT 36.6 07/01/2024    MONOPCT 6.9 07/01/2024    EOSPCT 3.3 07/01/2024    BASOPCT 0.5 07/01/2024    MONOSABS 0.71 07/01/2024    LYMPHSABS 3.75 07/01/2024    EOSABS 0.34 07/01/2024    BASOSABS 0.05 07/01/2024     CMP:    Lab Results   Component Value Date     (L) 07/11/2024    K 4.1 07/11/2024     07/11/2024    CO2 23 07/11/2024    BUN 22 07/11/2024    CREATININE 0.89 07/11/2024    GLUCOSE 176 (H) 07/11/2024    PROT 7.4  03/06/2024    CALCIUM 9.0 07/11/2024    BILITOT 0.5 03/06/2024    ALKPHOS 86 03/06/2024    AST 16 03/06/2024    ALT 16 03/06/2024     BMP:    Lab Results   Component Value Date     (L) 07/11/2024    K 4.1 07/11/2024     07/11/2024    CO2 23 07/11/2024    BUN 22 07/11/2024    CREATININE 0.89 07/11/2024    CALCIUM 9.0 07/11/2024    GLUCOSE 176 (H) 07/11/2024     Magnesium:  Lab Results   Component Value Date    MG 1.53 (L) 07/11/2024     Troponin:    Lab Results   Component Value Date    TROPHS <3 07/01/2024    TROPHS <3 07/01/2024    TROPHS <3 07/01/2024     BNP:   Lab Results   Component Value Date    BNP 31 07/01/2024       Lipid Panel:  Lab Results   Component Value Date    HDL 36.5 07/01/2024    CHHDL 2.4 07/01/2024    VLDL 17 07/01/2024    TRIG 85 07/01/2024    NHDL 51 07/01/2024        Lab work and imaging results independently reviewed by me         Visit Vitals  OB Status Hysterectomy   Smoking Status Every Day          ECG dated 6/03/2024 independently reviewed   NSR 81             Problem List Items Addressed This Visit             ICD-10-CM    DM2 (diabetes mellitus, type 2) (Multi) E11.9    HTN (hypertension) I10    Cigarette nicotine dependence without complication F17.210    Hyperlipidemia - Primary E78.5    Coronary artery disease involving native coronary artery of native heart I25.10       Tolerating antihypertensive regimen,  which she is tolerating well   --Amlodipine 2.5 mg   --Lisinopril 2.5 mg        Most recent LDL 77 mg/dL with T2DM,  ASCVD on CT chest   --Change Simvastatin to Atorvastatin    --ASA 81 mg     We discussed the importance of complete smoking cessation;  she declines assistance    Call cardiology as needed            08/08/24 at 9:46 AM - JUAN DAVID Love-CNP  25 minutes       Followup Appts:  Future Appointments   Date Time Provider Department Center   8/9/2024 10:00 AM [facility] [id number] [id number]   11/27/2024  9:00 AM [id number]

## 2024-08-09 ENCOUNTER — HOSPITAL ENCOUNTER (OUTPATIENT)
Dept: VASCULAR MEDICINE | Facility: CLINIC | Age: 54
Discharge: HOME | End: 2024-08-09
Payer: COMMERCIAL

## 2024-08-09 DIAGNOSIS — I73.9 INTERMITTENT CLAUDICATION (CMS-HCC): ICD-10-CM

## 2024-08-09 PROCEDURE — 93922 UPR/L XTREMITY ART 2 LEVELS: CPT | Performed by: INTERNAL MEDICINE

## 2024-08-09 PROCEDURE — 93922 UPR/L XTREMITY ART 2 LEVELS: CPT

## 2024-08-11 DIAGNOSIS — I73.9 PAD (PERIPHERAL ARTERY DISEASE) (CMS-HCC): Primary | ICD-10-CM

## 2024-09-07 DIAGNOSIS — F34.1 PERSISTENT DEPRESSIVE DISORDER: ICD-10-CM

## 2024-09-07 DIAGNOSIS — M79.10 MYALGIA: ICD-10-CM

## 2024-09-09 RX ORDER — DULOXETIN HYDROCHLORIDE 30 MG/1
30 CAPSULE, DELAYED RELEASE ORAL DAILY
Qty: 90 CAPSULE | Refills: 3 | Status: SHIPPED | OUTPATIENT
Start: 2024-09-09 | End: 2025-09-09

## 2024-09-30 ENCOUNTER — PATIENT OUTREACH (OUTPATIENT)
Dept: PRIMARY CARE | Facility: CLINIC | Age: 54
End: 2024-09-30
Payer: COMMERCIAL

## 2024-09-30 NOTE — PROGRESS NOTES
Patient has met target of no readmission for (90) days post (hospital, SNF, rehab) discharge and is graduated from Transitional Care Management program at this time.    Richmond Andrade LPN

## 2024-11-26 NOTE — PROGRESS NOTES
Subjective     HPI   Carmelita Mcdonald is a 54 y.o. year old female patient with presenting to clinic with concern for      Unstable angina, Mild non-obstructive CAD    HTN  -amlodipine 2.5  -lisinopril 2.5    BP Readings from Last 5 Encounters:   11/27/24 112/60   07/18/24 119/66   07/10/24 116/62   07/01/24 109/66   06/03/24 127/73     [facility] [place]  Hx cath Dr Aguirre  ASA 81    HLD  -atorvastatin 80    GERD  - pantoprazole 40mg    Encouraged to stop smoking    DM- Endocrinology [facility] poorly controlled on Metformin, intolerant of other meds.   Lab Results   Component Value Date    HGBA1C 8.8 (A) 08/21/2024   -metformin XR 1000 bid  - did not start after concern for weight gain  -jardiance 10 mg every day     Endocrinology wants to start her on insulin  Statin- atorvastatin 80mg at bedtime  ACE I lisinopril 2.5mg  ASA 81  Eye exam done 11/2024      Chronic fatigue with cervical spine pain  -meloxicam 15qd PRN  -cymbalta 30 qd      Intermittent claudication  Noted cramping and pain in legs- alexus Left leg for several months + concern for intermittent claudication    DEXA- unlikely to do, medical bills are too expensive.    Patient Active Problem List   Diagnosis    BPPV (benign paroxysmal positional vertigo)    Anxiety    Allergic rhinitis    Chronic fatigue    DM2 (diabetes mellitus, type 2) (Multi)    Herniated cervical disc    Hiatal hernia    HTN (hypertension)    GERD (gastroesophageal reflux disease)    Cigarette nicotine dependence without complication    Stage 1 chronic kidney disease    Vitamin D deficiency    Hyperlipidemia    Chest pain    Coronary artery disease involving native coronary artery of native heart    Intermittent claudication (CMS-[facility])    Chronic fatigue and malaise    Excessive vitamin B12 intake    PAD (peripheral artery disease) (CMS-[facility])       Past Medical History:   Diagnosis Date    Cervical radiculopathy, acute 07/11/2023    Depression     History of tonsillectomy 07/11/2023     Hyperlipidemia     Hypertension     Nontraumatic tear of right rotator cuff 2023    Peripheral neuropathy     Rotator cuff tear 2023    Type 2 diabetes mellitus       Past Surgical History:   Procedure Laterality Date    CARDIAC CATHETERIZATION N/A 2024    Procedure: Left Heart Cath;  Surgeon: Richmond Aguirre MD;  Location: [facility] Cardiac Cath Lab;  Service: Cardiovascular;  Laterality: N/A;     SECTION, CLASSIC      x2    HYSTERECTOMY  2020    Hysterectomy    SPINE SURGERY        Family History   Problem Relation Name Age of Onset    Diabetes Mother      Hypertension Mother      Hyperlipidemia Mother      Breast cancer Mother  54    Diabetes Father      Hypertension Father        Social History     Tobacco Use    Smoking status: Every Day     Current packs/day: 1.00     Average packs/day: 1 pack/day for 38.9 years (38.9 ttl pk-yrs)     Types: Cigarettes     Start date:      Passive exposure: Current    Smokeless tobacco: Never    Tobacco comments:     Shared decision making: Discussed LDCT. Interested in lung cancer screening and pursuing treatment options if abnormality found. Patient is in agreement with this   Substance Use Topics    Alcohol use: Yes     Comment: social        Current Outpatient Medications:     amLODIPine (Norvasc) 2.5 mg tablet, Take 1 tablet (2.5 mg) by mouth once daily., Disp: 30 tablet, Rfl: 11    aspirin 81 mg chewable tablet, Chew 1 tablet (81 mg) once daily., Disp: 30 tablet, Rfl: 11    atorvastatin (Lipitor) 80 mg tablet, Take 1 tablet (80 mg) by mouth once daily., Disp: 30 tablet, Rfl: 11    cholecalciferol (Vitamin D-3) 25 MCG (1000 UT) capsule, Take 1 capsule (25 mcg) by mouth once daily., Disp: , Rfl:     DULoxetine (Cymbalta) 30 mg DR capsule, Take 1 capsule (30 mg) by mouth once daily. Do not crush or chew., Disp: 90 capsule, Rfl: 3    lactobacillus acidophilus & bulgar (Lactinex) 1 million cell chewable tablet, Chew 1 tablet once daily., Disp:  ", Rfl:     lisinopril 2.5 mg tablet, Take 1 tablet (2.5 mg) by mouth once daily., Disp: 90 tablet, Rfl: 3    magnesium glycinate 100 mg tablet, Take 2 tablets by mouth once daily at bedtime., Disp: 180 tablet, Rfl: 1    meloxicam (Mobic) 15 mg tablet, Take 1 tablet (15 mg) by mouth once daily as needed for moderate pain (4 - 6)., Disp: 90 tablet, Rfl: 1    metFORMIN  mg 24 hr tablet, Take 2 tablets (1,000 mg) by mouth 2 times a day. Do not start taking this again until July 4, 2024 Do not fill before July 4, 2024., Disp: , Rfl:     OneTouch Verio Flex meter misc, Use as directed, Disp: , Rfl:     OneTouch Verio test strips strip, Use as instructed to check blood sugar 3 times daily. E11.65, Disp: , Rfl:     pantoprazole (ProtoNix) 40 mg EC tablet, Take 1 tablet (40 mg) by mouth once daily in the morning. Take before meals. Do not crush, chew, or split., Disp: 30 tablet, Rfl: 11     Review of Systems  Constitutional: Denies fever  HEENT: Denies ST, earache  CVS: Denies Chest pain  Pulmonary: Denies wheezing, SOB  GI: Denies N/V  : Denies dysuria  Musculoskeletal:  Denies myalgia  Neuro: Denies focal weakness or numbness.  Skin: Denies Rashes.  *Review of Systems is negative unless otherwise mentioned in HPI or ROS above.    Objective   /60   Pulse 98   Temp 36.3 °C (97.3 °F)   Ht 1.6 m (5' 3\")   Wt 72 kg (158 lb 12.8 oz)   SpO2 96%   BMI 28.13 kg/m²  reviewed Body mass index is 28.13 kg/m².     Physical Exam  Constitutional: NAD. Afebrile. Resting comfortably.  ENT: Nasal mucosa and oropharynx: moist oral mucosa. Posterior oropharynx nonerythematous. No posterior pharyngeal streaking.  TM: Bilat TM nonerythematous, pearly grey, landmarks intact. EAC wnl bilat.  Eyes: PERRLA. EOM intact.   Lymph: No anterior cervical chain or submandibular lymphadenopathy. No sentinel lymph nodes.  Cardiac: Regular rate & rhythm. No murmur, gallops, or rubs.  Pulmonary: Lungs clear to auscultation bilaterally with " good aeration. No wheezes, rhonchi, or rales.   GI: Soft, Nontender, nondistended. No guarding. Normal BS x4.  : No suprapubic tenderness. No CVA tenderness to percussion.   Musculoskeletal: No peripheral edema.   Skin: No evidence of trauma. No rashes  Neuro: No focal neuro deficits. Normal gait without assistive devices.  Psych: Intact judgement and insight.    .Assessment/Plan   Problem List Items Addressed This Visit             ICD-10-CM    DM2 (diabetes mellitus, type 2) (Multi) E11.9    HTN (hypertension) - Primary I10    Coronary artery disease involving native coronary artery of native heart I25.10    Intermittent claudication (CMS-[facility]) I73.9     Other Visit Diagnoses         Codes    Screening mammogram for breast cancer     Z12.31    Relevant Orders    BI mammo bilateral screening tomosynthesis

## 2024-11-27 ENCOUNTER — APPOINTMENT (OUTPATIENT)
Dept: PRIMARY CARE | Facility: CLINIC | Age: 54
End: 2024-11-27
Payer: COMMERCIAL

## 2024-11-27 VITALS
HEART RATE: 98 BPM | HEIGHT: 63 IN | TEMPERATURE: 97.3 F | DIASTOLIC BLOOD PRESSURE: 60 MMHG | WEIGHT: 158.8 LBS | OXYGEN SATURATION: 96 % | BODY MASS INDEX: 28.14 KG/M2 | SYSTOLIC BLOOD PRESSURE: 112 MMHG

## 2024-11-27 DIAGNOSIS — E11.9 TYPE 2 DIABETES MELLITUS WITHOUT COMPLICATION, UNSPECIFIED WHETHER LONG TERM INSULIN USE (MULTI): ICD-10-CM

## 2024-11-27 DIAGNOSIS — I25.10 CORONARY ARTERY DISEASE INVOLVING NATIVE CORONARY ARTERY OF NATIVE HEART WITHOUT ANGINA PECTORIS: ICD-10-CM

## 2024-11-27 DIAGNOSIS — Z12.31 SCREENING MAMMOGRAM FOR BREAST CANCER: ICD-10-CM

## 2024-11-27 DIAGNOSIS — I10 PRIMARY HYPERTENSION: Primary | ICD-10-CM

## 2024-11-27 DIAGNOSIS — I73.9 INTERMITTENT CLAUDICATION (CMS-HCC): ICD-10-CM

## 2024-11-27 PROCEDURE — 3048F LDL-C <100 MG/DL: CPT | Performed by: PHYSICIAN ASSISTANT

## 2024-11-27 PROCEDURE — 3008F BODY MASS INDEX DOCD: CPT | Performed by: PHYSICIAN ASSISTANT

## 2024-11-27 PROCEDURE — 4010F ACE/ARB THERAPY RXD/TAKEN: CPT | Performed by: PHYSICIAN ASSISTANT

## 2024-11-27 PROCEDURE — 3074F SYST BP LT 130 MM HG: CPT | Performed by: PHYSICIAN ASSISTANT

## 2024-11-27 PROCEDURE — 99213 OFFICE O/P EST LOW 20 MIN: CPT | Performed by: PHYSICIAN ASSISTANT

## 2024-11-27 PROCEDURE — 3078F DIAST BP <80 MM HG: CPT | Performed by: PHYSICIAN ASSISTANT

## 2024-11-27 ASSESSMENT — PATIENT HEALTH QUESTIONNAIRE - PHQ9
2. FEELING DOWN, DEPRESSED OR HOPELESS: NOT AT ALL
1. LITTLE INTEREST OR PLEASURE IN DOING THINGS: NOT AT ALL
SUM OF ALL RESPONSES TO PHQ9 QUESTIONS 1 AND 2: 0

## 2024-12-05 DIAGNOSIS — G43.719 INTRACTABLE CHRONIC MIGRAINE WITHOUT AURA AND WITHOUT STATUS MIGRAINOSUS: ICD-10-CM

## 2025-02-04 DIAGNOSIS — R52 PAIN: ICD-10-CM

## 2025-02-04 RX ORDER — MELOXICAM 15 MG/1
15 TABLET ORAL DAILY PRN
Qty: 90 TABLET | Refills: 1 | Status: SHIPPED | OUTPATIENT
Start: 2025-02-04

## 2025-03-11 ENCOUNTER — TELEPHONE (OUTPATIENT)
Dept: RADIOLOGY | Facility: HOSPITAL | Age: 55
End: 2025-03-11
Payer: COMMERCIAL

## 2025-03-11 NOTE — TELEPHONE ENCOUNTER
VM message left requesting patient to call and schedule the follow up Lung Cancer Screening exam. Requested they schedule with radiology @ [phone] .Encouraged  to return my call @  [phone] for any additional assistance.

## 2025-05-09 DIAGNOSIS — E11.9 TYPE 2 DIABETES MELLITUS WITHOUT COMPLICATION, WITHOUT LONG-TERM CURRENT USE OF INSULIN: ICD-10-CM

## 2025-05-09 DIAGNOSIS — E78.2 MIXED HYPERLIPIDEMIA: ICD-10-CM

## 2025-05-09 RX ORDER — ATORVASTATIN CALCIUM 80 MG/1
80 TABLET, FILM COATED ORAL DAILY
Qty: 30 TABLET | Refills: 11 | Status: SHIPPED | OUTPATIENT
Start: 2025-05-09 | End: 2026-05-09

## 2025-05-28 ENCOUNTER — APPOINTMENT (OUTPATIENT)
Dept: PRIMARY CARE | Facility: CLINIC | Age: 55
End: 2025-05-28
Payer: COMMERCIAL

## 2025-05-28 VITALS
BODY MASS INDEX: 28.03 KG/M2 | HEIGHT: 63 IN | WEIGHT: 158.2 LBS | SYSTOLIC BLOOD PRESSURE: 125 MMHG | DIASTOLIC BLOOD PRESSURE: 70 MMHG | OXYGEN SATURATION: 97 % | TEMPERATURE: 96.9 F | HEART RATE: 92 BPM

## 2025-05-28 DIAGNOSIS — E55.9 VITAMIN D INSUFFICIENCY: ICD-10-CM

## 2025-05-28 DIAGNOSIS — E78.5 BORDERLINE HYPERLIPIDEMIA: ICD-10-CM

## 2025-05-28 DIAGNOSIS — Z00.00 ROUTINE GENERAL MEDICAL EXAMINATION AT A HEALTH CARE FACILITY: Primary | ICD-10-CM

## 2025-05-28 DIAGNOSIS — E53.8 B12 DEFICIENCY: ICD-10-CM

## 2025-05-28 PROCEDURE — 4010F ACE/ARB THERAPY RXD/TAKEN: CPT | Performed by: PHYSICIAN ASSISTANT

## 2025-05-28 PROCEDURE — 99396 PREV VISIT EST AGE 40-64: CPT | Performed by: PHYSICIAN ASSISTANT

## 2025-05-28 PROCEDURE — 3008F BODY MASS INDEX DOCD: CPT | Performed by: PHYSICIAN ASSISTANT

## 2025-05-28 PROCEDURE — 3078F DIAST BP <80 MM HG: CPT | Performed by: PHYSICIAN ASSISTANT

## 2025-05-28 PROCEDURE — 3074F SYST BP LT 130 MM HG: CPT | Performed by: PHYSICIAN ASSISTANT

## 2025-05-28 RX ORDER — SEMAGLUTIDE 1.34 MG/ML
0.25 INJECTION, SOLUTION SUBCUTANEOUS
COMMUNITY
Start: 2025-05-16

## 2025-05-28 NOTE — PROGRESS NOTES
Subjective     HPI   Carmelita Mcdonald is a 54 y.o. year old female patient with presenting to clinic with concern for   Chief Complaint   Patient presents with    Annual Exam       HTN  -amlodipine 2.5mg  -lisinopril 2.5   BP Readings from Last 5 Encounters:   05/28/25 125/70   11/27/24 112/60   07/18/24 119/66   07/10/24 116/62   07/01/24 109/66     HLD goal LDL <70  -atorvastatin 80  Lab Results   Component Value Date    LDLCALC 34 07/01/2024     Mild non-obstructive CAD   Hx cath Dr Aguirre  ASA 81      DM 2  Endocrinology [facility]   poorly controlled on Metformin, intolerant of other meds.   -metformin XR 1000 bid  - did not start after concern for weight gain  -jardiance 25 mg every day   Endocrinology wants to start her on insulin, pt refuses  Lab Results   Component Value Date    HGBA1C 8.7 (H) 04/16/2025    HGBA1C 8.8 (A) 08/21/2024    HGBA1C 7.8 (A) 05/15/2024     Lab Results   Component Value Date    LDLCALC 34 07/01/2024    CREATININE 0.89 07/11/2024     Diabetes Composite Score: 2   Values used to calculate this score:    Points  Metrics       1        Blood Pressure: 125/70       1        Prescribed Statins: Yes       0        Hemoglobin A1c: 8.7%       0        Smokes Tobacco: Yes       0        Prescribed Aspirin: No  Statin- atorvastatin 80mg at bedtime  ACE I lisinopril 2.5mg  ASA 81  Eye exam done 11/2024     Chronic fatigue with cervical spine pain  -meloxicam 15qd PRN  -cymbalta 30 qd     DEXA- unlikely to do, medical bills are too expensive.     Remnded for mammogram and to schedule LDCT      PHQ9 is 15 recommended counseling, pt declined. Daughter has cancer. Discussed increasing cymbalta. Pt declined for now.    Problem List[1]    Medical History[2]   Surgical History[3]   Family History[4]   Social History     Tobacco Use    Smoking status: Every Day     Current packs/day: 1.00     Average packs/day: 1 pack/day for 39.4 years (39.4 ttl pk-yrs)     Types: Cigarettes     Start date: 1986     Passive  "exposure: Current    Smokeless tobacco: Never    Tobacco comments:     Shared decision making: Discussed LDCT. Interested in lung cancer screening and pursuing treatment options if abnormality found. Patient is in agreement with this   Substance Use Topics    Alcohol use: Yes     Comment: social      Current Medications[5]     Review of Systems  Constitutional: Denies fever  HEENT: Denies ST, earache  CVS: Denies Chest pain  Pulmonary: Denies wheezing, SOB  GI: Denies N/V  : Denies dysuria  Musculoskeletal:  Denies myalgia  Neuro: Denies focal weakness or numbness.  Skin: Denies Rashes.  *Review of Systems is negative unless otherwise mentioned in HPI or ROS above.    Objective   /70   Pulse 92   Temp 36.1 °C (96.9 °F)   Ht 1.595 m (5' 2.8\")   Wt 71.8 kg (158 lb 3.2 oz)   SpO2 97%   BMI 28.21 kg/m²  reviewed Body mass index is 28.21 kg/m².     Physical Exam  Constitutional: NAD.  Resting comfortably.  Head: Atraumatic, normocephalic.  ENT: Moist oral mucosa. Nasal mucosa wnl.   Cardiac: Regular rate & rhythm.   Pulmonary: Lungs clear bilat  GI: Soft, Nontender, nondistended.   Musculoskeletal: No peripheral edema.   Skin: No evidence of trauma. No rashes  Psych: Intact judgement and insight.    .Assessment/Plan   Problem List Items Addressed This Visit    None  Visit Diagnoses         Codes      Routine general medical examination at a health care facility    -  Primary Z00.00      Borderline hyperlipidemia     E78.5    Relevant Orders    CBC    Comprehensive Metabolic Panel    TSH with reflex to Free T4 if abnormal    Lipid Panel      Vitamin D insufficiency     E55.9    Relevant Orders    Vitamin D 25-Hydroxy,Total (for eval of Vitamin D levels)      B12 deficiency     E53.8    Relevant Orders    Vitamin B12                 [1]   Patient Active Problem List  Diagnosis    BPPV (benign paroxysmal positional vertigo)    Anxiety    Allergic rhinitis    Chronic fatigue    DM2 (diabetes mellitus, type 2) " (Multi)    Herniated cervical disc    Hiatal hernia    HTN (hypertension)    GERD (gastroesophageal reflux disease)    Cigarette nicotine dependence without complication    Stage 1 chronic kidney disease    Vitamin D deficiency    Hyperlipidemia    Chest pain    Coronary artery disease involving native coronary artery of native heart    Intermittent claudication    Chronic fatigue and malaise    Excessive vitamin B12 intake    PAD (peripheral artery disease)   [2]   Past Medical History:  Diagnosis Date    Cervical radiculopathy, acute 2023    Depression     History of tonsillectomy 2023    Hyperlipidemia     Hypertension     Nontraumatic tear of right rotator cuff 2023    Peripheral neuropathy     Rotator cuff tear 2023    Type 2 diabetes mellitus    [3]   Past Surgical History:  Procedure Laterality Date    CARDIAC CATHETERIZATION N/A 2024    Procedure: Left Heart Cath;  Surgeon: Richmond Aguirre MD;  Location: [facility] Cardiac Cath Lab;  Service: Cardiovascular;  Laterality: N/A;     SECTION, CLASSIC      x2    HYSTERECTOMY  2020    Hysterectomy    SPINE SURGERY     [4]   Family History  Problem Relation Name Age of Onset    Diabetes Mother      Hypertension Mother      Hyperlipidemia Mother      Breast cancer Mother  54    Diabetes Father      Hypertension Father     [5]   Current Outpatient Medications:     amLODIPine (Norvasc) 2.5 mg tablet, Take 1 tablet (2.5 mg) by mouth once daily., Disp: 30 tablet, Rfl: 11    aspirin 81 mg chewable tablet, Chew 1 tablet (81 mg) once daily., Disp: 30 tablet, Rfl: 11    atorvastatin (Lipitor) 80 mg tablet, Take 1 tablet (80 mg) by mouth once daily., Disp: 30 tablet, Rfl: 11    cholecalciferol (Vitamin D-3) 25 MCG (1000 UT) capsule, Take 1 capsule (25 mcg) by mouth once daily., Disp: , Rfl:     DULoxetine (Cymbalta) 30 mg DR capsule, Take 1 capsule (30 mg) by mouth once daily. Do not crush or chew., Disp: 90 capsule, Rfl: 3     empagliflozin (Jardiance) 25 mg tablet, Take 1 tablet (25 mg) by mouth once daily with breakfast., Disp: , Rfl:     lisinopril 2.5 mg tablet, Take 1 tablet (2.5 mg) by mouth once daily., Disp: 90 tablet, Rfl: 3    magnesium glycinate 100 mg tablet, Take 2 tablets by mouth once daily at bedtime., Disp: 180 tablet, Rfl: 1    meloxicam (Mobic) 15 mg tablet, Take 1 tablet (15 mg) by mouth once daily as needed for moderate pain (4 - 6)., Disp: 90 tablet, Rfl: 1    metFORMIN  mg 24 hr tablet, Take 2 tablets (1,000 mg) by mouth 2 times a day. Do not start taking this again until July 4, 2024 Do not fill before July 4, 2024., Disp: , Rfl:     OneTouch Verio Flex meter misc, Use as directed, Disp: , Rfl:     OneTouch Verio test strips strip, Use as instructed to check blood sugar 3 times daily. E11.65, Disp: , Rfl:     pantoprazole (ProtoNix) 40 mg EC tablet, Take 1 tablet (40 mg) by mouth once daily in the morning. Take before meals. Do not crush, chew, or split., Disp: 30 tablet, Rfl: 11    semaglutide (Ozempic) 0.25 mg or 0.5 mg(2 mg/1.5 mL) pen injector, Inject 0.25 mg under the skin every 7 days., Disp: , Rfl:     lactobacillus acidophilus & bulgar (Lactinex) 1 million cell chewable tablet, Chew 1 tablet once daily. (Patient not taking: Reported on 5/28/2025), Disp: , Rfl:

## 2025-06-04 DIAGNOSIS — I25.10 ASCVD (ARTERIOSCLEROTIC CARDIOVASCULAR DISEASE): ICD-10-CM

## 2025-06-04 RX ORDER — NAPROXEN SODIUM 220 MG/1
81 TABLET, FILM COATED ORAL DAILY
Qty: 30 TABLET | Refills: 11 | Status: SHIPPED | OUTPATIENT
Start: 2025-06-04 | End: 2026-06-04

## 2025-06-24 ENCOUNTER — HOSPITAL ENCOUNTER (OUTPATIENT)
Dept: RADIOLOGY | Facility: HOSPITAL | Age: 55
Discharge: HOME | End: 2025-06-24
Payer: COMMERCIAL

## 2025-06-24 VITALS — HEIGHT: 62 IN | WEIGHT: 158 LBS | BODY MASS INDEX: 29.08 KG/M2

## 2025-06-24 DIAGNOSIS — Z12.31 SCREENING MAMMOGRAM FOR BREAST CANCER: ICD-10-CM

## 2025-06-24 PROCEDURE — 77063 BREAST TOMOSYNTHESIS BI: CPT

## 2025-06-24 PROCEDURE — 77067 SCR MAMMO BI INCL CAD: CPT | Performed by: RADIOLOGY

## 2025-06-24 PROCEDURE — 77063 BREAST TOMOSYNTHESIS BI: CPT | Performed by: RADIOLOGY

## 2025-07-01 DIAGNOSIS — E78.5 BORDERLINE HYPERLIPIDEMIA: ICD-10-CM

## 2025-07-01 DIAGNOSIS — E53.8 B12 DEFICIENCY: ICD-10-CM

## 2025-07-01 DIAGNOSIS — E55.9 VITAMIN D INSUFFICIENCY: ICD-10-CM

## 2025-07-14 ENCOUNTER — TELEMEDICINE (OUTPATIENT)
Dept: PRIMARY CARE | Facility: CLINIC | Age: 55
End: 2025-07-14
Payer: COMMERCIAL

## 2025-07-14 DIAGNOSIS — E11.69 TYPE 2 DIABETES MELLITUS WITH OTHER SPECIFIED COMPLICATION, WITHOUT LONG-TERM CURRENT USE OF INSULIN: ICD-10-CM

## 2025-07-14 DIAGNOSIS — S29.019A THORACIC MYOFASCIAL STRAIN, INITIAL ENCOUNTER: Primary | ICD-10-CM

## 2025-07-14 PROCEDURE — 99213 OFFICE O/P EST LOW 20 MIN: CPT | Performed by: PHYSICIAN ASSISTANT

## 2025-07-14 PROCEDURE — 4010F ACE/ARB THERAPY RXD/TAKEN: CPT | Performed by: PHYSICIAN ASSISTANT

## 2025-07-14 RX ORDER — LISINOPRIL 2.5 MG/1
2.5 TABLET ORAL DAILY
Qty: 90 TABLET | Refills: 3 | Status: SHIPPED | OUTPATIENT
Start: 2025-07-14 | End: 2026-07-14

## 2025-07-14 RX ORDER — PREDNISONE 20 MG/1
40 TABLET ORAL DAILY
Qty: 10 TABLET | Refills: 0 | Status: SHIPPED | OUTPATIENT
Start: 2025-07-14 | End: 2025-07-19

## 2025-07-14 RX ORDER — METHOCARBAMOL 500 MG/1
500 TABLET, FILM COATED ORAL 2 TIMES DAILY PRN
Qty: 40 TABLET | Refills: 0 | Status: SHIPPED | OUTPATIENT
Start: 2025-07-14

## 2025-07-14 ASSESSMENT — ENCOUNTER SYMPTOMS
WEAKNESS: 1
PARESTHESIAS: 1
PERIANAL NUMBNESS: 0
DYSURIA: 0
LEG PAIN: 0
FEVER: 0
HEADACHES: 0
PARESIS: 0
TINGLING: 0
BOWEL INCONTINENCE: 0
ABDOMINAL PAIN: 0
BACK PAIN: 1
WEIGHT LOSS: 0
NUMBNESS: 0

## 2025-07-14 NOTE — PROGRESS NOTES
Answers submitted by the patient for this visit:  Back Pain Questionnaire (Submitted on 7/14/2025)  Chief Complaint: Back pain  Chronicity: new  Onset: in the past 7 days  Frequency: constantly  Progression since onset: waxing and waning  Pain location: thoracic spine  Pain quality: aching, burning, shooting, stabbing  Pain - numeric: 4/10  Pain is: the same all the time  Aggravated by: bending, coughing, position, twisting  abdominal pain: No  bladder incontinence: No  bowel incontinence: No  chest pain: No  fever: No  headaches: No  leg pain: No  numbness: No  perianal numbness: No  dysuria: No  paresis: No  pelvic pain: No  paresthesias: Yes  tingling: No  weakness: Yes  weight loss: No  An interactive audio and video telecommunication system which permits real time communications between the patient (at the originating site) and provider (at the distant site) was utilized to provide this telehealth service.   Verbal consent was requested and obtained from Carmelita Mcdonald on this date, 07/14/25 for a telehealth visit and the patient's location was confirmed at the time of the visit.    Subjective    Carmelita Mcdonald is a 54 y.o. year old female patient presenting for virtual visit   Chief Complaint   Patient presents with    upper back pain      Upper left shoulder upper back pain. Alleviated by rest. Pulled muscle.   Tried left over cyclobenzaprine with some relief but excessive fatigue.  NKI  Tight muscle sensation, stabbing pain with certain movements.   Paresthesia sensation up upper left scapula area to left lateral superior trapezius area.      Problem List[1]    Medical History[2]   Surgical History[3]   Family History[4]   Social History     Tobacco Use    Smoking status: Every Day     Current packs/day: 1.00     Average packs/day: 1 pack/day for 39.5 years (39.5 ttl pk-yrs)     Types: Cigarettes     Start date: 1986     Passive exposure: Current    Smokeless tobacco: Never    Tobacco comments:     Shared  decision making: Discussed LDCT. Interested in lung cancer screening and pursuing treatment options if abnormality found. Patient is in agreement with this   Substance Use Topics    Alcohol use: Yes     Alcohol/week: 1.0 standard drink of alcohol     Types: 1 Standard drinks or equivalent per week     Comment: social      Current Medications[5]     Review of Systems    Review of Systems:   Constitutional: Denies fever  HEENT: Denies ST, earache  CVS: Denies Chest pain  Pulmonary: Denies wheezing, SOB  GI: Denies N/V  : Denies dysuria  Musculoskeletal:  Denies myalgia  Neuro: Denies focal weakness or numbness.  Skin: Denies Rashes.  *Review of Systems is negative unless otherwise mentioned in HPI or ROS above.     Objective    VITALS  Pt does not have vitals available at time of visit.    Exam       Limited physical exam in virtual platform  Nontoxic. No acute distress.  Nonlabored breathing.    Assessment/Plan      Problem List Items Addressed This Visit       DM2 (diabetes mellitus, type 2) (Multi)    Relevant Medications    lisinopril 2.5 mg tablet     Other Visit Diagnoses         Thoracic myofascial strain, initial encounter    -  Primary    Relevant Medications    methocarbamol (Robaxin) 500 mg tablet    predniSONE (Deltasone) 20 mg tablet                [1]   Patient Active Problem List  Diagnosis    BPPV (benign paroxysmal positional vertigo)    Anxiety    Allergic rhinitis    Chronic fatigue    DM2 (diabetes mellitus, type 2) (Multi)    Herniated cervical disc    Hiatal hernia    HTN (hypertension)    GERD (gastroesophageal reflux disease)    Cigarette nicotine dependence without complication    Stage 1 chronic kidney disease    Vitamin D deficiency    Hyperlipidemia    Chest pain    Coronary artery disease involving native coronary artery of native heart    Intermittent claudication    Chronic fatigue and malaise    Excessive vitamin B12 intake    PAD (peripheral artery disease)   [2]   Past Medical  History:  Diagnosis Date    Anxiety     Cervical radiculopathy, acute 2023    Depression     History of tonsillectomy 2023    Hyperlipidemia     Hypertension     Nontraumatic tear of right rotator cuff 2023    Peripheral neuropathy     Rotator cuff tear 2023    Type 2 diabetes mellitus    [3]   Past Surgical History:  Procedure Laterality Date    CARDIAC CATHETERIZATION N/A 2024    Procedure: Left Heart Cath;  Surgeon: Richmond Aguirre MD;  Location: [facility];  Service: Cardiovascular;  Laterality: N/A;     SECTION, CLASSIC      x2     SECTION, LOW TRANSVERSE      HYSTERECTOMY  2020    Hysterectomy    SPINE SURGERY     [4]   Family History  Problem Relation Name Age of Onset    Diabetes Mother      Hypertension Mother      Hyperlipidemia Mother      Breast cancer Mother  54    Diabetes Father      Hypertension Father      Heart disease Father      Asthma Son     [5]   Current Outpatient Medications:     amLODIPine (Norvasc) 2.5 mg tablet, Take 1 tablet (2.5 mg) by mouth once daily., Disp: 30 tablet, Rfl: 11    aspirin 81 mg chewable tablet, Chew 1 tablet (81 mg) once daily., Disp: 30 tablet, Rfl: 11    atorvastatin (Lipitor) 80 mg tablet, Take 1 tablet (80 mg) by mouth once daily., Disp: 30 tablet, Rfl: 11    cholecalciferol (Vitamin D-3) 25 MCG (1000 UT) capsule, Take 1 capsule (25 mcg) by mouth once daily., Disp: , Rfl:     DULoxetine (Cymbalta) 30 mg DR capsule, Take 1 capsule (30 mg) by mouth once daily. Do not crush or chew., Disp: 90 capsule, Rfl: 3    empagliflozin (Jardiance) 25 mg tablet, Take 1 tablet (25 mg) by mouth once daily with breakfast., Disp: , Rfl:     lisinopril 2.5 mg tablet, Take 1 tablet (2.5 mg) by mouth once daily., Disp: 90 tablet, Rfl: 3    meloxicam (Mobic) 15 mg tablet, Take 1 tablet (15 mg) by mouth once daily as needed for moderate pain (4 - 6)., Disp: 90 tablet, Rfl: 1    metFORMIN  mg 24 hr tablet, Take 2  tablets (1,000 mg) by mouth 2 times a day. Do not start taking this again until July 4, 2024 Do not fill before July 4, 2024., Disp: , Rfl:     methocarbamol (Robaxin) 500 mg tablet, Take 1 tablet (500 mg) by mouth 2 times a day as needed for muscle spasms., Disp: 40 tablet, Rfl: 0    OneTouch Verio Flex meter misc, Use as directed, Disp: , Rfl:     OneTouch Verio test strips strip, Use as instructed to check blood sugar 3 times daily. E11.65, Disp: , Rfl:     pantoprazole (ProtoNix) 40 mg EC tablet, Take 1 tablet (40 mg) by mouth once daily in the morning. Take before meals. Do not crush, chew, or split., Disp: 30 tablet, Rfl: 11    predniSONE (Deltasone) 20 mg tablet, Take 2 tablets (40 mg) by mouth once daily for 5 days., Disp: 10 tablet, Rfl: 0    semaglutide (Ozempic) 0.25 mg or 0.5 mg(2 mg/1.5 mL) pen injector, Inject 0.25 mg under the skin every 7 days., Disp: , Rfl:

## 2025-07-15 DIAGNOSIS — R52 PAIN: ICD-10-CM

## 2025-07-15 RX ORDER — MELOXICAM 15 MG/1
15 TABLET ORAL DAILY PRN
Qty: 90 TABLET | Refills: 3 | Status: SHIPPED | OUTPATIENT
Start: 2025-07-15

## 2025-07-29 DIAGNOSIS — I15.9 SECONDARY HYPERTENSION: ICD-10-CM

## 2025-07-29 RX ORDER — AMLODIPINE BESYLATE 2.5 MG/1
2.5 TABLET ORAL DAILY
Qty: 90 TABLET | Refills: 3 | Status: SHIPPED | OUTPATIENT
Start: 2025-07-29

## 2025-08-05 LAB
25(OH)D3+25(OH)D2 SERPL-MCNC: 65 NG/ML (ref 30–100)
ALBUMIN SERPL-MCNC: 4.6 G/DL (ref 3.6–5.1)
ALP SERPL-CCNC: 71 U/L (ref 37–153)
ALT SERPL-CCNC: 10 U/L (ref 6–29)
ANION GAP SERPL CALCULATED.4IONS-SCNC: 9 MMOL/L (CALC) (ref 7–17)
AST SERPL-CCNC: 11 U/L (ref 10–35)
BILIRUB SERPL-MCNC: 0.6 MG/DL (ref 0.2–1.2)
BUN SERPL-MCNC: 18 MG/DL (ref 7–25)
CALCIUM SERPL-MCNC: 9.4 MG/DL (ref 8.6–10.4)
CHLORIDE SERPL-SCNC: 103 MMOL/L (ref 98–110)
CHOLEST SERPL-MCNC: 116 MG/DL
CHOLEST/HDLC SERPL: 2.6 (CALC)
CO2 SERPL-SCNC: 25 MMOL/L (ref 20–32)
CREAT SERPL-MCNC: 0.84 MG/DL (ref 0.5–1.03)
EGFRCR SERPLBLD CKD-EPI 2021: 82 ML/MIN/1.73M2
ERYTHROCYTE [DISTWIDTH] IN BLOOD BY AUTOMATED COUNT: 14.1 % (ref 11–15)
GLUCOSE SERPL-MCNC: 143 MG/DL (ref 65–99)
HCT VFR BLD AUTO: 49.6 % (ref 35–45)
HDLC SERPL-MCNC: 44 MG/DL
HGB BLD-MCNC: 16.5 G/DL (ref 11.7–15.5)
LDLC SERPL CALC-MCNC: 50 MG/DL (CALC)
MCH RBC QN AUTO: 29.5 PG (ref 27–33)
MCHC RBC AUTO-ENTMCNC: 33.3 G/DL (ref 32–36)
MCV RBC AUTO: 88.7 FL (ref 80–100)
NONHDLC SERPL-MCNC: 72 MG/DL (CALC)
PLATELET # BLD AUTO: 262 THOUSAND/UL (ref 140–400)
PMV BLD REES-ECKER: 9.8 FL (ref 7.5–12.5)
POTASSIUM SERPL-SCNC: 4.4 MMOL/L (ref 3.5–5.3)
PROT SERPL-MCNC: 6.7 G/DL (ref 6.1–8.1)
RBC # BLD AUTO: 5.59 MILLION/UL (ref 3.8–5.1)
SODIUM SERPL-SCNC: 137 MMOL/L (ref 135–146)
TRIGL SERPL-MCNC: 132 MG/DL
TSH SERPL-ACNC: 0.66 MIU/L (ref 0.4–4.5)
VIT B12 SERPL-MCNC: 454 PG/ML (ref 200–1100)
WBC # BLD AUTO: 7.9 THOUSAND/UL (ref 3.8–10.8)

## 2025-09-01 DIAGNOSIS — F34.1 PERSISTENT DEPRESSIVE DISORDER: ICD-10-CM

## 2025-09-01 DIAGNOSIS — M79.10 MYALGIA: ICD-10-CM

## 2025-09-02 RX ORDER — DULOXETIN HYDROCHLORIDE 30 MG/1
30 CAPSULE, DELAYED RELEASE ORAL DAILY
Qty: 90 CAPSULE | Refills: 3 | Status: SHIPPED | OUTPATIENT
Start: 2025-09-02 | End: 2026-09-02

## 2025-11-26 ENCOUNTER — APPOINTMENT (OUTPATIENT)
Dept: PRIMARY CARE | Facility: CLINIC | Age: 55
End: 2025-11-26
Payer: COMMERCIAL

## (undated) DEVICE — CATHETER, ANGIO, IMPULSE, AL1, 5 FR X 100 CM

## (undated) DEVICE — CATHETER, OPTITORQUE, 5FR, TIG1, 1H/100CM

## (undated) DEVICE — CATHETER, ANGIO, IMPULSE, MPA1, 6 FR X 100 CM

## (undated) DEVICE — ANGIO KIT, LEFT HEART, LF, CUSTOM

## (undated) DEVICE — NEEDLE, ENTRY, PERCUTANEOUS, 21 G X 2.5 CM

## (undated) DEVICE — TR BAND, RADIAL COMPRESSION, STANDARD, 24CM

## (undated) DEVICE — GUIDEWIRE, INQWIRE, 3MM J, .035 X 210CM, FIXED

## (undated) DEVICE — HAND CONTROL KIT, PREMIUM, AT-P65

## (undated) DEVICE — MANIFOLD KIT, CUSTOM, GEAUGA

## (undated) DEVICE — CATHETER, ANGIO, IMPULSE, FL3.5, 6 FR X 100 CM

## (undated) DEVICE — INTRODUCER SHEATH, GLIDESHEATH, 6FR 10CM